# Patient Record
Sex: MALE | Race: BLACK OR AFRICAN AMERICAN | NOT HISPANIC OR LATINO | URBAN - METROPOLITAN AREA
[De-identification: names, ages, dates, MRNs, and addresses within clinical notes are randomized per-mention and may not be internally consistent; named-entity substitution may affect disease eponyms.]

---

## 2020-07-25 ENCOUNTER — TELEPHONE ENCOUNTER (OUTPATIENT)
Dept: URBAN - METROPOLITAN AREA CLINIC 13 | Facility: CLINIC | Age: 60
End: 2020-07-25

## 2020-07-25 RX ORDER — DICYCLOMINE HYDROCHLORIDE 10 MG/1
CAPSULE ORAL
Qty: 60 | Refills: 0 | OUTPATIENT
Start: 2019-09-26 | End: 2020-01-08

## 2020-07-25 RX ORDER — CYCLOBENZAPRINE HYDROCHLORIDE 5 MG/1
TABLET, FILM COATED ORAL
Qty: 30 | Refills: 0 | OUTPATIENT
Start: 2019-01-25 | End: 2020-01-08

## 2020-07-25 RX ORDER — KETOCONAZOLE 20 MG/G
APPLY A THIN LAYER TO AFFECTED AREA(S) TWICE DAILY CREAM TOPICAL
Refills: 0 | OUTPATIENT
Start: 2019-07-01 | End: 2020-01-08

## 2020-07-25 RX ORDER — ESOMEPRAZOLE MAGNESIUM 40 MG/1
TAKE 1 CAPSULE ONCE DAILY CAPSULE, DELAYED RELEASE PELLETS ORAL
Refills: 0 | OUTPATIENT
Start: 2019-01-10 | End: 2020-01-08

## 2020-07-25 RX ORDER — PREDNISONE 20 MG/1
TAKE 1 TABLET AS NEEDED TABLET ORAL
Refills: 0 | OUTPATIENT
Start: 2019-11-08 | End: 2020-01-08

## 2020-07-25 RX ORDER — SERTRALINE 50 MG/1
TAKE 1 TABLET DAILY TABLET, FILM COATED ORAL
Refills: 0 | OUTPATIENT
Start: 2019-08-16 | End: 2020-01-08

## 2020-07-25 RX ORDER — SODIUM SULFATE, POTASSIUM SULFATE, MAGNESIUM SULFATE 17.5; 3.13; 1.6 G/ML; G/ML; G/ML
SOLUTION, CONCENTRATE ORAL
Qty: 354 | Refills: 0 | OUTPATIENT
Start: 2019-08-16 | End: 2020-01-08

## 2020-07-25 RX ORDER — CLOTRIMAZOLE 10 MG/G
APPLY 2-3 TIMES DAILY TO AFFECTED AREA(S) CREAM TOPICAL
Refills: 0 | OUTPATIENT
Start: 2019-10-09 | End: 2020-01-08

## 2020-07-25 RX ORDER — DICLOFENAC SODIUM 75 MG/1
TABLET, DELAYED RELEASE ORAL
Qty: 60 | Refills: 0 | OUTPATIENT
Start: 2019-06-28 | End: 2020-01-08

## 2020-07-25 RX ORDER — METHYLPREDNISOLONE 4 MG/1
TABLET ORAL
Qty: 21 | Refills: 0 | OUTPATIENT
Start: 2019-02-27 | End: 2020-01-08

## 2020-07-25 RX ORDER — HYDROCORTISONE ACETATE 25 MG
INSERT 1 SUPPOSITORY RECTALLY AT BEDTIME AS NEEDED SUPPOSITORY, RECTAL RECTAL
Refills: 0 | OUTPATIENT
Start: 2019-10-30 | End: 2020-01-08

## 2020-07-25 RX ORDER — METHOCARBAMOL 500 MG/1
TABLET, FILM COATED ORAL
Qty: 40 | Refills: 0 | OUTPATIENT
Start: 2019-06-28 | End: 2020-01-08

## 2020-07-25 RX ORDER — ALPRAZOLAM 2 MG/1
TAKE 1 TABLET TWICE DAILY AS NEEDED TABLET ORAL
Refills: 0 | OUTPATIENT
Start: 2019-12-04 | End: 2020-01-08

## 2020-07-25 RX ORDER — CYCLOBENZAPRINE HYDROCHLORIDE 10 MG/1
TAKE 1 TABLET TWICE DAILY AS NEEDED TABLET, FILM COATED ORAL
Refills: 0 | OUTPATIENT
Start: 2019-12-28 | End: 2020-01-08

## 2020-07-25 RX ORDER — TERBINAFINE HYDROCHLORIDE 250 MG/1
TAKE 1 TABLET DAILY AS DIRECTED TABLET ORAL
Refills: 0 | OUTPATIENT
Start: 2019-10-08 | End: 2020-01-08

## 2020-07-26 ENCOUNTER — TELEPHONE ENCOUNTER (OUTPATIENT)
Dept: URBAN - METROPOLITAN AREA CLINIC 13 | Facility: CLINIC | Age: 60
End: 2020-07-26

## 2020-07-26 RX ORDER — BIFIDOBACTERIUM LONGUM 10MM CELL
TAKE 1 CAPSULE DAILY CAPSULE ORAL
Refills: 0 | Status: ACTIVE | COMMUNITY

## 2020-07-26 RX ORDER — DICYCLOMINE HYDROCHLORIDE 20 MG/1
TAKE 2 TABLET 4 TIMES DAILY PRN TABLET ORAL
Qty: 240 | Refills: 2 | Status: ACTIVE | COMMUNITY
Start: 2019-11-13

## 2020-07-26 RX ORDER — TRAMADOL HYDROCHLORIDE AND ACETAMINOPHEN 37.5; 325 MG/1; MG/1
TAKE 2 TABLETS EVERY 4-6 HOURS AS NEEDED FOR PAIN RELIEF TABLET, FILM COATED ORAL
Refills: 0 | Status: ACTIVE | COMMUNITY
Start: 2019-01-24

## 2020-07-26 RX ORDER — LIDOCAINE HYDROCHLORIDE 30 MG/G
USE TOPICALLY AS DIRECTED CREAM TOPICAL
Refills: 0 | Status: ACTIVE | COMMUNITY

## 2020-07-26 RX ORDER — RAMIPRIL 10 MG/1
TAKE 1 CAPSULE ONCE DAILY CAPSULE ORAL
Refills: 0 | Status: ACTIVE | COMMUNITY
Start: 2019-02-13

## 2020-07-26 RX ORDER — SODIUM SULFATE, POTASSIUM SULFATE, MAGNESIUM SULFATE 17.5; 3.13; 1.6 G/ML; G/ML; G/ML
5PM THE DAY BEFORE PROCEDURE, DRINK 1/2 OF PREP, THEN 6HOURS BEFORE PROCEDURE DRINK REMAINDER OF PREP SOLUTION, CONCENTRATE ORAL
Qty: 1 | Refills: 0 | Status: ACTIVE | COMMUNITY
Start: 2020-07-29

## 2020-07-26 RX ORDER — GABAPENTIN 300 MG/1
TAKE 1 CAPSULE CAPSULE ORAL
Refills: 0 | Status: ACTIVE | COMMUNITY
Start: 2020-01-02

## 2020-07-26 RX ORDER — HYDROCHLOROTHIAZIDE 12.5 MG/1
TAKE 1 CAPSULE ONCE DAILY CAPSULE ORAL
Refills: 0 | Status: ACTIVE | COMMUNITY
Start: 2019-02-22

## 2020-07-26 RX ORDER — FLUTICASONE PROPIONATE 50 UG/1
USE 1 SPRAY IN EACH NOSTRIL TWICE DAILY SPRAY, METERED NASAL
Refills: 0 | Status: ACTIVE | COMMUNITY
Start: 2019-06-26

## 2020-07-26 RX ORDER — TIZANIDINE 4 MG/1
TAKE 1 OR 2 TABLETS EVERY 8 HOURS AS NEEDED TABLET ORAL
Refills: 0 | Status: ACTIVE | COMMUNITY
Start: 2020-01-02

## 2020-07-26 RX ORDER — OMEPRAZOLE 40 MG/1
TAKE 1 CAPSULE DAILY CAPSULE, DELAYED RELEASE ORAL
Refills: 0 | Status: ACTIVE | COMMUNITY
Start: 2019-12-03

## 2020-07-26 RX ORDER — FAMOTIDINE 20 MG/1
TAKE 1 TABLET DAILY TABLET ORAL
Refills: 0 | Status: ACTIVE | COMMUNITY
Start: 2019-11-07

## 2020-07-26 RX ORDER — MELOXICAM 15 MG/1
TAKE 1 TABLET DAILY AS NEEDED TABLET ORAL
Refills: 0 | Status: ACTIVE | COMMUNITY
Start: 2019-12-16

## 2020-08-26 ENCOUNTER — TELEPHONE ENCOUNTER (OUTPATIENT)
Dept: URBAN - METROPOLITAN AREA CLINIC 113 | Facility: CLINIC | Age: 60
End: 2020-08-26

## 2020-08-28 ENCOUNTER — OFFICE VISIT (OUTPATIENT)
Dept: URBAN - METROPOLITAN AREA SURGERY CENTER 25 | Facility: SURGERY CENTER | Age: 60
End: 2020-08-28
Payer: MEDICARE

## 2020-08-28 ENCOUNTER — CLAIMS CREATED FROM THE CLAIM WINDOW (OUTPATIENT)
Dept: URBAN - METROPOLITAN AREA CLINIC 4 | Facility: CLINIC | Age: 60
End: 2020-08-28
Payer: MEDICARE

## 2020-08-28 DIAGNOSIS — K63.89 BACTERIAL OVERGROWTH SYNDROME: ICD-10-CM

## 2020-08-28 DIAGNOSIS — K63.89 OTHER SPECIFIED DISEASES OF INTESTINE: ICD-10-CM

## 2020-08-28 DIAGNOSIS — Z85.038 PERSONAL HISTORY OF COLON CANCER: ICD-10-CM

## 2020-08-28 PROCEDURE — 45380 COLONOSCOPY AND BIOPSY: CPT | Performed by: INTERNAL MEDICINE

## 2020-08-28 PROCEDURE — G9936 PMH PLYP/NEO CO/RECT/JUN/ANS: HCPCS | Performed by: INTERNAL MEDICINE

## 2020-08-28 PROCEDURE — 88305 TISSUE EXAM BY PATHOLOGIST: CPT | Performed by: PATHOLOGY

## 2020-08-28 RX ORDER — TRAMADOL HYDROCHLORIDE AND ACETAMINOPHEN 37.5; 325 MG/1; MG/1
TAKE 2 TABLETS EVERY 4-6 HOURS AS NEEDED FOR PAIN RELIEF TABLET, FILM COATED ORAL
Refills: 0 | Status: ACTIVE | COMMUNITY
Start: 2019-01-24

## 2020-08-28 RX ORDER — RAMIPRIL 10 MG/1
TAKE 1 CAPSULE ONCE DAILY CAPSULE ORAL
Refills: 0 | Status: ACTIVE | COMMUNITY
Start: 2019-02-13

## 2020-08-28 RX ORDER — OMEPRAZOLE 40 MG/1
TAKE 1 CAPSULE DAILY CAPSULE, DELAYED RELEASE ORAL
Refills: 0 | Status: ACTIVE | COMMUNITY
Start: 2019-12-03

## 2020-08-28 RX ORDER — SODIUM SULFATE, POTASSIUM SULFATE, MAGNESIUM SULFATE 17.5; 3.13; 1.6 G/ML; G/ML; G/ML
5PM THE DAY BEFORE PROCEDURE, DRINK 1/2 OF PREP, THEN 6HOURS BEFORE PROCEDURE DRINK REMAINDER OF PREP SOLUTION, CONCENTRATE ORAL
Qty: 1 | Refills: 0 | Status: ACTIVE | COMMUNITY
Start: 2020-07-29

## 2020-08-28 RX ORDER — DICYCLOMINE HYDROCHLORIDE 20 MG/1
TAKE 2 TABLET 4 TIMES DAILY PRN TABLET ORAL
Qty: 240 | Refills: 2 | Status: ACTIVE | COMMUNITY
Start: 2019-11-13

## 2020-08-28 RX ORDER — BIFIDOBACTERIUM LONGUM 10MM CELL
TAKE 1 CAPSULE DAILY CAPSULE ORAL
Refills: 0 | Status: ACTIVE | COMMUNITY

## 2020-08-28 RX ORDER — LIDOCAINE HYDROCHLORIDE 30 MG/G
USE TOPICALLY AS DIRECTED CREAM TOPICAL
Refills: 0 | Status: ACTIVE | COMMUNITY

## 2020-08-28 RX ORDER — MELOXICAM 15 MG/1
TAKE 1 TABLET DAILY AS NEEDED TABLET ORAL
Refills: 0 | Status: ACTIVE | COMMUNITY
Start: 2019-12-16

## 2020-08-28 RX ORDER — TIZANIDINE 4 MG/1
TAKE 1 OR 2 TABLETS EVERY 8 HOURS AS NEEDED TABLET ORAL
Refills: 0 | Status: ACTIVE | COMMUNITY
Start: 2020-01-02

## 2020-08-28 RX ORDER — GABAPENTIN 300 MG/1
TAKE 1 CAPSULE CAPSULE ORAL
Refills: 0 | Status: ACTIVE | COMMUNITY
Start: 2020-01-02

## 2020-08-28 RX ORDER — FLUTICASONE PROPIONATE 50 UG/1
USE 1 SPRAY IN EACH NOSTRIL TWICE DAILY SPRAY, METERED NASAL
Refills: 0 | Status: ACTIVE | COMMUNITY
Start: 2019-06-26

## 2020-08-28 RX ORDER — HYDROCHLOROTHIAZIDE 12.5 MG/1
TAKE 1 CAPSULE ONCE DAILY CAPSULE ORAL
Refills: 0 | Status: ACTIVE | COMMUNITY
Start: 2019-02-22

## 2020-08-28 RX ORDER — FAMOTIDINE 20 MG/1
TAKE 1 TABLET DAILY TABLET ORAL
Refills: 0 | Status: ACTIVE | COMMUNITY
Start: 2019-11-07

## 2020-09-04 ENCOUNTER — OFFICE VISIT (OUTPATIENT)
Dept: URBAN - METROPOLITAN AREA CLINIC 113 | Facility: CLINIC | Age: 60
End: 2020-09-04
Payer: MEDICARE

## 2020-09-04 VITALS
BODY MASS INDEX: 31.01 KG/M2 | WEIGHT: 234 LBS | TEMPERATURE: 97.8 F | DIASTOLIC BLOOD PRESSURE: 82 MMHG | HEART RATE: 72 BPM | HEIGHT: 73 IN | RESPIRATION RATE: 20 BRPM | SYSTOLIC BLOOD PRESSURE: 138 MMHG

## 2020-09-04 DIAGNOSIS — K59.01 CONSTIPATION: ICD-10-CM

## 2020-09-04 DIAGNOSIS — C18.9 COLON CANCER: ICD-10-CM

## 2020-09-04 PROCEDURE — 99213 OFFICE O/P EST LOW 20 MIN: CPT | Performed by: INTERNAL MEDICINE

## 2020-09-04 PROCEDURE — G8427 DOCREV CUR MEDS BY ELIG CLIN: HCPCS | Performed by: INTERNAL MEDICINE

## 2020-09-04 PROCEDURE — 1036F TOBACCO NON-USER: CPT | Performed by: INTERNAL MEDICINE

## 2020-09-04 PROCEDURE — 3017F COLORECTAL CA SCREEN DOC REV: CPT | Performed by: INTERNAL MEDICINE

## 2020-09-04 PROCEDURE — G8419 CALC BMI OUT NRM PARAM NOF/U: HCPCS | Performed by: INTERNAL MEDICINE

## 2020-09-04 RX ORDER — SODIUM SULFATE, POTASSIUM SULFATE, MAGNESIUM SULFATE 17.5; 3.13; 1.6 G/ML; G/ML; G/ML
5PM THE DAY BEFORE PROCEDURE, DRINK 1/2 OF PREP, THEN 6HOURS BEFORE PROCEDURE DRINK REMAINDER OF PREP SOLUTION, CONCENTRATE ORAL
Qty: 1 | Refills: 0 | Status: DISCONTINUED | COMMUNITY
Start: 2020-07-29

## 2020-09-04 RX ORDER — OMEPRAZOLE 40 MG/1
TAKE 1 CAPSULE DAILY CAPSULE, DELAYED RELEASE ORAL
Refills: 0 | Status: ACTIVE | COMMUNITY
Start: 2019-12-03

## 2020-09-04 RX ORDER — ASPIRIN 325 MG
1 CAPSULE TABLET ORAL ONCE A DAY
Status: ACTIVE | COMMUNITY

## 2020-09-04 RX ORDER — DICYCLOMINE HYDROCHLORIDE 20 MG/1
TAKE 2 TABLET 4 TIMES DAILY PRN TABLET ORAL
Qty: 240 | Refills: 2 | Status: ACTIVE | COMMUNITY
Start: 2019-11-13

## 2020-09-04 RX ORDER — RAMIPRIL 10 MG/1
TAKE 1 CAPSULE ONCE DAILY CAPSULE ORAL
Refills: 0 | Status: ACTIVE | COMMUNITY
Start: 2019-02-13

## 2020-09-04 RX ORDER — BIFIDOBACTERIUM LONGUM 10MM CELL
TAKE 1 CAPSULE DAILY CAPSULE ORAL
Refills: 0 | Status: DISCONTINUED | COMMUNITY

## 2020-09-04 RX ORDER — PSYLLIUM SEED
AS DIRECTED PACKET (EA) ORAL
Status: ACTIVE | COMMUNITY

## 2020-09-04 RX ORDER — LIDOCAINE HYDROCHLORIDE 30 MG/G
USE TOPICALLY AS DIRECTED CREAM TOPICAL
Refills: 0 | Status: ACTIVE | COMMUNITY

## 2020-09-04 RX ORDER — GABAPENTIN 300 MG/1
TAKE 1 CAPSULE CAPSULE ORAL
Refills: 0 | Status: ACTIVE | COMMUNITY
Start: 2020-01-02

## 2020-09-04 RX ORDER — TIZANIDINE 4 MG/1
TAKE 1 OR 2 TABLETS EVERY 8 HOURS AS NEEDED TABLET ORAL
Refills: 0 | Status: DISCONTINUED | COMMUNITY
Start: 2020-01-02

## 2020-09-04 RX ORDER — TRAMADOL HYDROCHLORIDE AND ACETAMINOPHEN 37.5; 325 MG/1; MG/1
TAKE 2 TABLETS EVERY 4-6 HOURS AS NEEDED FOR PAIN RELIEF TABLET, FILM COATED ORAL
Refills: 0 | Status: ACTIVE | COMMUNITY
Start: 2019-01-24

## 2020-09-04 RX ORDER — BIFIDOBACTERIUM LONGUM 10MM CELL
AS DIRECTED CAPSULE ORAL
Status: ACTIVE | COMMUNITY

## 2020-09-04 RX ORDER — HYDROCHLOROTHIAZIDE 12.5 MG/1
TAKE 1 CAPSULE ONCE DAILY CAPSULE ORAL
Refills: 0 | Status: ACTIVE | COMMUNITY
Start: 2019-02-22

## 2020-09-04 RX ORDER — SALICYLIC ACID 3 G/100G
1 TABLET LOTION TOPICAL ONCE A DAY
Status: ACTIVE | COMMUNITY

## 2020-09-04 RX ORDER — FLUTICASONE PROPIONATE 50 UG/1
USE 1 SPRAY IN EACH NOSTRIL TWICE DAILY SPRAY, METERED NASAL
Refills: 0 | Status: DISCONTINUED | COMMUNITY
Start: 2019-06-26

## 2020-09-04 RX ORDER — IBUPROFEN 200 MG/1
1 TABLET WITH FOOD OR MILK AS NEEDED TABLET, FILM COATED ORAL THREE TIMES A DAY
Status: ACTIVE | COMMUNITY

## 2020-09-04 RX ORDER — FAMOTIDINE 20 MG/1
TAKE 1 TABLET DAILY TABLET ORAL
Refills: 0 | Status: ACTIVE | COMMUNITY
Start: 2019-11-07

## 2020-09-04 RX ORDER — MELOXICAM 15 MG/1
TAKE 1 TABLET DAILY AS NEEDED TABLET ORAL
Refills: 0 | Status: ACTIVE | COMMUNITY
Start: 2019-12-16

## 2020-09-04 NOTE — HPI-TODAY'S VISIT:
59 y/o male presenting for f/u. He does have a hx of colon cancer in 2014. He also had a tubular adenoma removed in 2019. He was taken for a colonoscopy recently (8/2020). It was normal. He is here today for f/u.   He does intermittently have constipation vs diarrhea. He cannot find any specific foods that cause diarrhea. He is taking Linzess; however he has stomach pain with it. He takes it PRN if he feels constipated. He has tried to take Metamucil every day. He sometimes doesn't like to take it since it causes feelings of incomplete evacuation.   He was taking Align in the past but could not tell any difference in taking it.  [place] [facility] [place], [place]osis.  [facility] [place]--sigmoid ta, hyperplastic polyp.
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
## 2020-11-09 VITALS
SYSTOLIC BLOOD PRESSURE: 178 MMHG | TEMPERATURE: 98 F | WEIGHT: 228.5 LBS | BODY MASS INDEX: 30.28 KG/M2 | HEART RATE: 74 BPM | HEIGHT: 73 IN | OXYGEN SATURATION: 98 % | DIASTOLIC BLOOD PRESSURE: 80 MMHG

## 2020-11-09 PROBLEM — E11.9 TYPE 2 DIABETES MELLITUS (HCC): Status: ACTIVE | Noted: 2020-11-09

## 2020-11-09 PROBLEM — K21.9 GERD (GASTROESOPHAGEAL REFLUX DISEASE): Status: ACTIVE | Noted: 2020-11-09

## 2020-11-09 PROBLEM — K58.9 IBS (IRRITABLE BOWEL SYNDROME): Status: ACTIVE | Noted: 2020-11-09

## 2020-11-09 PROBLEM — E66.9 OBESITY: Status: ACTIVE | Noted: 2020-11-09

## 2020-11-09 PROBLEM — M54.50 CHRONIC LOW BACK PAIN: Status: ACTIVE | Noted: 2020-11-09

## 2020-11-09 PROBLEM — G89.29 CHRONIC LOW BACK PAIN: Status: ACTIVE | Noted: 2020-11-09

## 2020-11-09 PROBLEM — E55.9 VITAMIN D DEFICIENCY: Status: ACTIVE | Noted: 2020-11-09

## 2020-11-09 PROBLEM — Z85.038 HISTORY OF MALIGNANT NEOPLASM OF COLON: Status: ACTIVE | Noted: 2020-11-09

## 2020-11-09 RX ORDER — OMEPRAZOLE 40 MG/1
40 CAPSULE, DELAYED RELEASE ORAL DAILY
COMMUNITY

## 2020-11-09 RX ORDER — ALPRAZOLAM 2 MG/1
TABLET ORAL
COMMUNITY

## 2020-11-09 RX ORDER — DICYCLOMINE HYDROCHLORIDE 10 MG/1
10 CAPSULE ORAL
COMMUNITY

## 2020-11-09 RX ORDER — TIZANIDINE 4 MG/1
4 TABLET ORAL
COMMUNITY

## 2020-11-09 RX ORDER — CHLORPHENIRAMINE MALEATE 4 MG
TABLET ORAL 2 TIMES DAILY
COMMUNITY

## 2020-11-09 RX ORDER — RAMIPRIL 10 MG/1
10 CAPSULE ORAL DAILY
COMMUNITY

## 2020-11-09 RX ORDER — FLUTICASONE PROPIONATE 50 MCG
2 SPRAY, SUSPENSION (ML) NASAL DAILY
COMMUNITY

## 2020-11-09 RX ORDER — SERTRALINE HYDROCHLORIDE 50 MG/1
TABLET, FILM COATED ORAL DAILY
COMMUNITY

## 2020-11-09 RX ORDER — HYDROCORTISONE 25 MG/G
CREAM TOPICAL 2 TIMES DAILY
COMMUNITY

## 2020-11-09 RX ORDER — DICLOFENAC SODIUM 10 MG/G
GEL TOPICAL 4 TIMES DAILY
COMMUNITY

## 2020-11-09 RX ORDER — MELOXICAM 15 MG/1
15 TABLET ORAL DAILY
COMMUNITY

## 2020-11-09 RX ORDER — FAMOTIDINE 20 MG/1
20 TABLET, FILM COATED ORAL 2 TIMES DAILY
COMMUNITY

## 2020-11-09 RX ORDER — PREDNISONE 20 MG/1
TABLET ORAL
COMMUNITY

## 2020-11-09 RX ORDER — TRAMADOL HYDROCHLORIDE 50 MG/1
50 TABLET ORAL
COMMUNITY

## 2020-11-09 RX ORDER — HYDROCORTISONE ACETATE 25 MG/1
25 SUPPOSITORY RECTAL EVERY 12 HOURS
COMMUNITY

## 2020-11-09 RX ORDER — LIDOCAINE 50 MG/G
PATCH TOPICAL EVERY 24 HOURS
COMMUNITY

## 2020-11-09 RX ORDER — DICYCLOMINE HYDROCHLORIDE 20 MG/1
20 TABLET ORAL EVERY 6 HOURS
COMMUNITY

## 2020-11-09 RX ORDER — HYOSCYAMINE SULFATE 0.12 MG/1
0.12 TABLET, ORALLY DISINTEGRATING ORAL
COMMUNITY

## 2020-11-09 RX ORDER — HYDROCHLOROTHIAZIDE 12.5 MG/1
12.5 CAPSULE ORAL DAILY
COMMUNITY

## 2020-11-09 RX ORDER — GABAPENTIN 300 MG/1
300 CAPSULE ORAL 3 TIMES DAILY
COMMUNITY

## 2020-11-09 RX ORDER — ESOMEPRAZOLE MAGNESIUM 40 MG/1
CAPSULE, DELAYED RELEASE ORAL DAILY
COMMUNITY

## 2020-11-09 RX ORDER — CYCLOBENZAPRINE HCL 10 MG
TABLET ORAL
COMMUNITY

## 2020-12-15 ENCOUNTER — OFFICE VISIT (OUTPATIENT)
Dept: URBAN - METROPOLITAN AREA CLINIC 113 | Facility: CLINIC | Age: 60
End: 2020-12-15
Payer: MEDICARE

## 2020-12-15 VITALS
SYSTOLIC BLOOD PRESSURE: 122 MMHG | RESPIRATION RATE: 18 BRPM | HEIGHT: 73 IN | TEMPERATURE: 98 F | HEART RATE: 71 BPM | DIASTOLIC BLOOD PRESSURE: 77 MMHG | BODY MASS INDEX: 31.81 KG/M2 | WEIGHT: 240 LBS

## 2020-12-15 DIAGNOSIS — R14.0 BLOATING: ICD-10-CM

## 2020-12-15 DIAGNOSIS — K63.89 OTHER SPECIFIED DISEASES OF INTESTINE: ICD-10-CM

## 2020-12-15 DIAGNOSIS — K59.01 CONSTIPATION: ICD-10-CM

## 2020-12-15 DIAGNOSIS — C18.9 COLON CANCER: ICD-10-CM

## 2020-12-15 PROCEDURE — G9901 SNP/LG TRM CRE PT W/POS CDE: HCPCS | Performed by: INTERNAL MEDICINE

## 2020-12-15 PROCEDURE — G8430 EC AT DOC MEDREC PT NOT ELIG: HCPCS | Performed by: INTERNAL MEDICINE

## 2020-12-15 PROCEDURE — G8422 PT INELIG BMI CALCULATION: HCPCS | Performed by: INTERNAL MEDICINE

## 2020-12-15 PROCEDURE — G8482 FLU IMMUNIZE ORDER/ADMIN: HCPCS | Performed by: INTERNAL MEDICINE

## 2020-12-15 PROCEDURE — 1036F TOBACCO NON-USER: CPT | Performed by: INTERNAL MEDICINE

## 2020-12-15 PROCEDURE — 99213 OFFICE O/P EST LOW 20 MIN: CPT | Performed by: INTERNAL MEDICINE

## 2020-12-15 RX ORDER — MELOXICAM 15 MG/1
TAKE 1 TABLET DAILY AS NEEDED TABLET ORAL
Refills: 0 | Status: DISCONTINUED | COMMUNITY
Start: 2019-12-16

## 2020-12-15 RX ORDER — HYDROCHLOROTHIAZIDE 12.5 MG/1
TAKE 1 CAPSULE ONCE DAILY CAPSULE ORAL
Refills: 0 | Status: ACTIVE | COMMUNITY
Start: 2019-02-22

## 2020-12-15 RX ORDER — FAMOTIDINE 20 MG/1
TAKE 1 TABLET DAILY TABLET ORAL
Refills: 0 | Status: ACTIVE | COMMUNITY
Start: 2019-11-07

## 2020-12-15 RX ORDER — TRAMADOL HYDROCHLORIDE AND ACETAMINOPHEN 37.5; 325 MG/1; MG/1
TAKE 2 TABLETS EVERY 4-6 HOURS AS NEEDED FOR PAIN RELIEF TABLET, FILM COATED ORAL
Refills: 0 | Status: ACTIVE | COMMUNITY
Start: 2019-01-24

## 2020-12-15 RX ORDER — SALICYLIC ACID 3 G/100G
1 TABLET LOTION TOPICAL ONCE A DAY
Status: ACTIVE | COMMUNITY

## 2020-12-15 RX ORDER — ASPIRIN 325 MG
1 CAPSULE TABLET ORAL ONCE A DAY
Status: ACTIVE | COMMUNITY

## 2020-12-15 RX ORDER — GABAPENTIN 300 MG/1
TAKE 1 CAPSULE CAPSULE ORAL
Refills: 0 | Status: ACTIVE | COMMUNITY
Start: 2020-01-02

## 2020-12-15 RX ORDER — OMEPRAZOLE 40 MG/1
TAKE 1 CAPSULE DAILY CAPSULE, DELAYED RELEASE ORAL
Refills: 0 | Status: ACTIVE | COMMUNITY
Start: 2019-12-03

## 2020-12-15 RX ORDER — PSYLLIUM SEED
AS DIRECTED PACKET (EA) ORAL
Status: ACTIVE | COMMUNITY

## 2020-12-15 RX ORDER — DICYCLOMINE HYDROCHLORIDE 20 MG/1
TAKE 2 TABLET 4 TIMES DAILY PRN TABLET ORAL
Qty: 240 | Refills: 2 | Status: ACTIVE | COMMUNITY
Start: 2019-11-13

## 2020-12-15 RX ORDER — LIDOCAINE HYDROCHLORIDE 30 MG/G
USE TOPICALLY AS DIRECTED CREAM TOPICAL
Refills: 0 | Status: ACTIVE | COMMUNITY

## 2020-12-15 RX ORDER — BIFIDOBACTERIUM LONGUM 10MM CELL
AS DIRECTED CAPSULE ORAL
Status: ACTIVE | COMMUNITY

## 2020-12-15 RX ORDER — IBUPROFEN 200 MG/1
1 TABLET WITH FOOD OR MILK AS NEEDED TABLET, FILM COATED ORAL THREE TIMES A DAY
Status: DISCONTINUED | COMMUNITY

## 2020-12-15 RX ORDER — DICYCLOMINE HYDROCHLORIDE 20 MG/1
TAKE 2 TABLET 4 TIMES DAILY PRN TABLET ORAL
Qty: 240
Start: 2019-11-13

## 2020-12-15 RX ORDER — RAMIPRIL 10 MG/1
TAKE 1 CAPSULE ONCE DAILY CAPSULE ORAL
Refills: 0 | Status: ACTIVE | COMMUNITY
Start: 2019-02-13
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
## 2020-12-15 NOTE — HPI-TODAY'S VISIT:
59 y/o male presenting for f/u. He has a prior hx of colon cancer and constipation. He has a bowel movement about every 2 days and does not take any medications currently. He was on benefiber and Miralax and stopped taking those. He does not have any rectal bleeding. He does have intermittent abdominal pain. He does take dicyclomine once daily; he is unsure if it helps his pain.   He has taken Align in the past and will go back on it. He was taking it for gas/bloating and it worked well.   9/4/2020 59 y/o male presenting for f/u. He does have a hx of colon cancer in 2014. He also had a tubular adenoma removed in 2019. He was taken for a colonoscopy recently (8/2020). It was normal. He is here today for f/u.   He does intermittently have constipation vs diarrhea. He cannot find any specific foods that cause diarrhea. He is taking Linzess; however he has stomach pain with it. He takes it PRN if he feels constipated. He has tried to take Metamucil every day. He sometimes doesn't like to take it since it causes feelings of incomplete evacuation.   He was taking Align in the past but could not tell any difference in taking it.  [place] [facility] [place], [place].  [facility] [place]sigmoid ta, hyperplastic polyp.

## 2021-02-16 ENCOUNTER — OFFICE VISIT (OUTPATIENT)
Dept: URBAN - METROPOLITAN AREA CLINIC 113 | Facility: CLINIC | Age: 61
End: 2021-02-16

## 2021-02-17 ENCOUNTER — TELEPHONE ENCOUNTER (OUTPATIENT)
Dept: URBAN - METROPOLITAN AREA CLINIC 113 | Facility: CLINIC | Age: 61
End: 2021-02-17

## 2021-02-19 ENCOUNTER — OFFICE VISIT (OUTPATIENT)
Dept: URBAN - METROPOLITAN AREA CLINIC 113 | Facility: CLINIC | Age: 61
End: 2021-02-19
Payer: MEDICARE

## 2021-02-19 VITALS
DIASTOLIC BLOOD PRESSURE: 75 MMHG | SYSTOLIC BLOOD PRESSURE: 120 MMHG | TEMPERATURE: 97.3 F | HEART RATE: 85 BPM | RESPIRATION RATE: 20 BRPM | WEIGHT: 222 LBS | HEIGHT: 73 IN | BODY MASS INDEX: 29.42 KG/M2

## 2021-02-19 DIAGNOSIS — K21.9 GASTROESOPHAGEAL REFLUX DISEASE, UNSPECIFIED WHETHER ESOPHAGITIS PRESENT: ICD-10-CM

## 2021-02-19 DIAGNOSIS — K63.89 OTHER SPECIFIED DISEASES OF INTESTINE: ICD-10-CM

## 2021-02-19 DIAGNOSIS — C18.9 COLON CANCER: ICD-10-CM

## 2021-02-19 DIAGNOSIS — R14.0 BLOATING: ICD-10-CM

## 2021-02-19 DIAGNOSIS — K59.01 CONSTIPATION: ICD-10-CM

## 2021-02-19 DIAGNOSIS — R11.2 INTRACTABLE VOMITING WITH NAUSEA, UNSPECIFIED VOMITING TYPE: ICD-10-CM

## 2021-02-19 PROCEDURE — 99213 OFFICE O/P EST LOW 20 MIN: CPT | Performed by: INTERNAL MEDICINE

## 2021-02-19 RX ORDER — TRAMADOL HYDROCHLORIDE AND ACETAMINOPHEN 37.5; 325 MG/1; MG/1
TAKE 2 TABLETS EVERY 4-6 HOURS AS NEEDED FOR PAIN RELIEF TABLET, FILM COATED ORAL
Refills: 0 | Status: ACTIVE | COMMUNITY
Start: 2019-01-24

## 2021-02-19 RX ORDER — FAMOTIDINE 20 MG/1
TAKE 1 TABLET DAILY TABLET ORAL
Refills: 0 | Status: ACTIVE | COMMUNITY
Start: 2019-11-07

## 2021-02-19 RX ORDER — ASPIRIN 325 MG
1 CAPSULE TABLET ORAL ONCE A DAY
Status: ACTIVE | COMMUNITY

## 2021-02-19 RX ORDER — BIFIDOBACTERIUM LONGUM 10MM CELL
AS DIRECTED CAPSULE ORAL
Status: ACTIVE | COMMUNITY

## 2021-02-19 RX ORDER — PSYLLIUM SEED
AS DIRECTED PACKET (EA) ORAL
Status: ACTIVE | COMMUNITY

## 2021-02-19 RX ORDER — RAMIPRIL 10 MG/1
TAKE 1 CAPSULE ONCE DAILY CAPSULE ORAL
Refills: 0 | Status: ACTIVE | COMMUNITY
Start: 2019-02-13

## 2021-02-19 RX ORDER — HYDROCHLOROTHIAZIDE 12.5 MG/1
TAKE 1 CAPSULE ONCE DAILY CAPSULE ORAL
Refills: 0 | Status: ACTIVE | COMMUNITY
Start: 2019-02-22

## 2021-02-19 RX ORDER — SALICYLIC ACID 3 G/100G
1 TABLET LOTION TOPICAL ONCE A DAY
Status: ACTIVE | COMMUNITY

## 2021-02-19 RX ORDER — LIDOCAINE HYDROCHLORIDE 30 MG/G
USE TOPICALLY AS DIRECTED CREAM TOPICAL
Refills: 0 | Status: ACTIVE | COMMUNITY

## 2021-02-19 RX ORDER — METFORMIN HYDROCHLORIDE 500 MG/1
1 TABLET WITH A MEAL TABLET, FILM COATED ORAL ONCE A DAY
Status: ACTIVE | COMMUNITY

## 2021-02-19 RX ORDER — OMEPRAZOLE 40 MG/1
TAKE 1 CAPSULE DAILY CAPSULE, DELAYED RELEASE ORAL
Refills: 0 | Status: ACTIVE | COMMUNITY
Start: 2019-12-03

## 2021-02-19 RX ORDER — DICYCLOMINE HYDROCHLORIDE 20 MG/1
TAKE 2 TABLET 4 TIMES DAILY PRN TABLET ORAL
Qty: 240 | Status: ACTIVE | COMMUNITY
Start: 2019-11-13

## 2021-02-19 RX ORDER — GABAPENTIN 300 MG/1
TAKE 1 CAPSULE CAPSULE ORAL
Refills: 0 | Status: ACTIVE | COMMUNITY
Start: 2020-01-02

## 2021-02-19 NOTE — HPI-TODAY'S VISIT:
62 y/o male presenting for f/u. He was last seen in the clinic in Dec 2020. Since last saturday he has been having abdominal pain/spasm/bloating. He has not had any nausea/emesis. He had also lost about 11 lbs.  I placed him on dicyclomine PRN and he has been taking it.   12/15/2020 61 y/o male presenting for f/u. He has a prior hx of colon cancer and constipation. He has a bowel movement about every 2 days and does not take any medications currently. He was on benefiber and Miralax and stopped taking those. He does not have any rectal bleeding. He does have intermittent abdominal pain. He does take dicyclomine once daily; he is unsure if it helps his pain.   He has taken Align in the past and will go back on it. He was taking it for gas/bloating and it worked well.   9/4/2020 61 y/o male presenting for f/u. He does have a hx of colon cancer in 2014. He also had a tubular adenoma removed in 2019. He was taken for a colonoscopy recently (8/2020). It was normal. He is here today for f/u.   He does intermittently have constipation vs diarrhea. He cannot find any specific foods that cause diarrhea. He is taking Linzess; however he has stomach pain with it. He takes it PRN if he feels constipated. He has tried to take Metamucil every day. He sometimes doesn't like to take it since it causes feelings of incomplete evacuation.   He was taking Align in the past but could not tell any difference in taking it.  [place] [facility] [place], [place]osis.  [facility] [place]  [place]--sigmoid ta, hyperplastic polyp.

## 2021-02-22 ENCOUNTER — OFFICE VISIT (OUTPATIENT)
Dept: URBAN - METROPOLITAN AREA SURGERY CENTER 25 | Facility: SURGERY CENTER | Age: 61
End: 2021-02-22
Payer: MEDICARE

## 2021-02-22 ENCOUNTER — CLAIMS CREATED FROM THE CLAIM WINDOW (OUTPATIENT)
Dept: URBAN - METROPOLITAN AREA CLINIC 4 | Facility: CLINIC | Age: 61
End: 2021-02-22
Payer: MEDICARE

## 2021-02-22 DIAGNOSIS — K31.89 DILATION OF STOMACH: ICD-10-CM

## 2021-02-22 DIAGNOSIS — R10.13 ABDOMINAL DISCOMFORT, EPIGASTRIC: ICD-10-CM

## 2021-02-22 PROCEDURE — 88305 TISSUE EXAM BY PATHOLOGIST: CPT | Performed by: PATHOLOGY

## 2021-02-22 PROCEDURE — 43239 EGD BIOPSY SINGLE/MULTIPLE: CPT | Performed by: INTERNAL MEDICINE

## 2021-02-22 PROCEDURE — G8907 PT DOC NO EVENTS ON DISCHARG: HCPCS | Performed by: INTERNAL MEDICINE

## 2021-02-22 PROCEDURE — 88312 SPECIAL STAINS GROUP 1: CPT | Performed by: PATHOLOGY

## 2021-02-22 RX ORDER — PSYLLIUM SEED
AS DIRECTED PACKET (EA) ORAL
Status: ACTIVE | COMMUNITY

## 2021-02-22 RX ORDER — BIFIDOBACTERIUM LONGUM 10MM CELL
AS DIRECTED CAPSULE ORAL
Status: ACTIVE | COMMUNITY

## 2021-02-22 RX ORDER — HYDROCHLOROTHIAZIDE 12.5 MG/1
TAKE 1 CAPSULE ONCE DAILY CAPSULE ORAL
Refills: 0 | Status: ACTIVE | COMMUNITY
Start: 2019-02-22

## 2021-02-22 RX ORDER — TRAMADOL HYDROCHLORIDE AND ACETAMINOPHEN 37.5; 325 MG/1; MG/1
TAKE 2 TABLETS EVERY 4-6 HOURS AS NEEDED FOR PAIN RELIEF TABLET, FILM COATED ORAL
Refills: 0 | Status: ACTIVE | COMMUNITY
Start: 2019-01-24

## 2021-02-22 RX ORDER — RAMIPRIL 10 MG/1
TAKE 1 CAPSULE ONCE DAILY CAPSULE ORAL
Refills: 0 | Status: ACTIVE | COMMUNITY
Start: 2019-02-13

## 2021-02-22 RX ORDER — FAMOTIDINE 20 MG/1
TAKE 1 TABLET DAILY TABLET ORAL
Refills: 0 | Status: ACTIVE | COMMUNITY
Start: 2019-11-07

## 2021-02-22 RX ORDER — SALICYLIC ACID 3 G/100G
1 TABLET LOTION TOPICAL ONCE A DAY
Status: ACTIVE | COMMUNITY

## 2021-02-22 RX ORDER — METFORMIN HYDROCHLORIDE 500 MG/1
1 TABLET WITH A MEAL TABLET, FILM COATED ORAL ONCE A DAY
Status: ACTIVE | COMMUNITY

## 2021-02-22 RX ORDER — GABAPENTIN 300 MG/1
TAKE 1 CAPSULE CAPSULE ORAL
Refills: 0 | Status: ACTIVE | COMMUNITY
Start: 2020-01-02

## 2021-02-22 RX ORDER — OMEPRAZOLE 40 MG/1
TAKE 1 CAPSULE DAILY CAPSULE, DELAYED RELEASE ORAL
Refills: 0 | Status: ACTIVE | COMMUNITY
Start: 2019-12-03

## 2021-02-22 RX ORDER — DICYCLOMINE HYDROCHLORIDE 20 MG/1
TAKE 2 TABLET 4 TIMES DAILY PRN TABLET ORAL
Qty: 240 | Status: ACTIVE | COMMUNITY
Start: 2019-11-13

## 2021-02-22 RX ORDER — LIDOCAINE HYDROCHLORIDE 30 MG/G
USE TOPICALLY AS DIRECTED CREAM TOPICAL
Refills: 0 | Status: ACTIVE | COMMUNITY

## 2021-02-22 RX ORDER — ASPIRIN 325 MG
1 CAPSULE TABLET ORAL ONCE A DAY
Status: ACTIVE | COMMUNITY

## 2021-02-26 ENCOUNTER — OFFICE VISIT (OUTPATIENT)
Dept: URBAN - METROPOLITAN AREA CLINIC 113 | Facility: CLINIC | Age: 61
End: 2021-02-26

## 2021-03-09 ENCOUNTER — OFFICE VISIT (OUTPATIENT)
Dept: URBAN - METROPOLITAN AREA CLINIC 107 | Facility: CLINIC | Age: 61
End: 2021-03-09
Payer: MEDICARE

## 2021-03-09 VITALS
TEMPERATURE: 98.5 F | HEIGHT: 73 IN | SYSTOLIC BLOOD PRESSURE: 136 MMHG | DIASTOLIC BLOOD PRESSURE: 79 MMHG | WEIGHT: 221 LBS | HEART RATE: 75 BPM | BODY MASS INDEX: 29.29 KG/M2

## 2021-03-09 DIAGNOSIS — R11.2 INTRACTABLE VOMITING WITH NAUSEA, UNSPECIFIED VOMITING TYPE: ICD-10-CM

## 2021-03-09 DIAGNOSIS — R14.0 BLOATING: ICD-10-CM

## 2021-03-09 DIAGNOSIS — K59.01 CONSTIPATION: ICD-10-CM

## 2021-03-09 DIAGNOSIS — K21.9 GASTROESOPHAGEAL REFLUX DISEASE, UNSPECIFIED WHETHER ESOPHAGITIS PRESENT: ICD-10-CM

## 2021-03-09 DIAGNOSIS — K63.89 OTHER SPECIFIED DISEASES OF INTESTINE: ICD-10-CM

## 2021-03-09 DIAGNOSIS — C18.9 COLON CANCER: ICD-10-CM

## 2021-03-09 PROCEDURE — 99213 OFFICE O/P EST LOW 20 MIN: CPT | Performed by: INTERNAL MEDICINE

## 2021-03-09 RX ORDER — HYDROCHLOROTHIAZIDE 12.5 MG/1
TAKE 1 CAPSULE ONCE DAILY CAPSULE ORAL
Refills: 0 | Status: ACTIVE | COMMUNITY
Start: 2019-02-22

## 2021-03-09 RX ORDER — LIDOCAINE HYDROCHLORIDE 30 MG/G
USE TOPICALLY AS DIRECTED CREAM TOPICAL
Refills: 0 | Status: ACTIVE | COMMUNITY

## 2021-03-09 RX ORDER — RAMIPRIL 10 MG/1
TAKE 1 CAPSULE ONCE DAILY CAPSULE ORAL
Refills: 0 | Status: ACTIVE | COMMUNITY
Start: 2019-02-13

## 2021-03-09 RX ORDER — GABAPENTIN 300 MG/1
TAKE 1 CAPSULE CAPSULE ORAL
Refills: 0 | Status: ACTIVE | COMMUNITY
Start: 2020-01-02

## 2021-03-09 RX ORDER — METFORMIN HYDROCHLORIDE 500 MG/1
1 TABLET WITH A MEAL TABLET, FILM COATED ORAL ONCE A DAY
Status: ACTIVE | COMMUNITY

## 2021-03-09 RX ORDER — BIFIDOBACTERIUM LONGUM 10MM CELL
AS DIRECTED CAPSULE ORAL
Status: ACTIVE | COMMUNITY

## 2021-03-09 RX ORDER — PSYLLIUM SEED
AS DIRECTED PACKET (EA) ORAL
Status: ACTIVE | COMMUNITY

## 2021-03-09 RX ORDER — FAMOTIDINE 20 MG/1
TAKE 1 TABLET DAILY TABLET ORAL
Refills: 0 | Status: ACTIVE | COMMUNITY
Start: 2019-11-07

## 2021-03-09 RX ORDER — TRAMADOL HYDROCHLORIDE AND ACETAMINOPHEN 37.5; 325 MG/1; MG/1
TAKE 2 TABLETS EVERY 4-6 HOURS AS NEEDED FOR PAIN RELIEF TABLET, FILM COATED ORAL
Refills: 0 | Status: ACTIVE | COMMUNITY
Start: 2019-01-24

## 2021-03-09 RX ORDER — ASPIRIN 325 MG
1 CAPSULE TABLET ORAL ONCE A DAY
Status: ACTIVE | COMMUNITY

## 2021-03-09 RX ORDER — DICYCLOMINE HYDROCHLORIDE 20 MG/1
TAKE 2 TABLET 4 TIMES DAILY PRN TABLET ORAL
Qty: 240 | Status: ACTIVE | COMMUNITY
Start: 2019-11-13

## 2021-03-09 RX ORDER — OMEPRAZOLE 20 MG/1
TAKE 1 CAPSULE DAILY CAPSULE, DELAYED RELEASE ORAL ONCE A DAY
Refills: 0 | Status: ACTIVE | COMMUNITY
Start: 2019-12-03

## 2021-03-09 RX ORDER — SALICYLIC ACID 3 G/100G
1 TABLET LOTION TOPICAL ONCE A DAY
Status: ACTIVE | COMMUNITY

## 2021-03-09 NOTE — PHYSICAL EXAM CARDIOVASCULAR:
regular rate and rhythm , S1, S2 normal ,  , no murmurs, rubs, gallops , no edema , no edema, no murmurs, regular rate and rhythm

## 2021-03-09 NOTE — PHYSICAL EXAM EYES:
Conjuntivae and eyelids appear normal , Sclerae : White without injection, no icterus. , Conjuntivae and eyelids appear normal, Sclerae : White without injection

## 2021-03-09 NOTE — PHYSICAL EXAM HENT:
atraumatic , normocephalic , Head, normocephalic, atraumatic, Face, Face within normal limits, Ears, External ears within normal limits, Nose/Nasopharynx, External nose  normal appearance, nares patent, no nasal discharge, Mouth and Throat, Oral cavity appearance normal, Breath odor normal, Lips, Appearance normal

## 2021-03-09 NOTE — HPI-TODAY'S VISIT:
62 y/o male presenting for f/u. He was last seen Feb 19 with abdominal pain/nausea. He had an EGD performed on 2/22/2020. He was found to have gastritis--bx were negative for any H.Pylori or metaplasia. He also had a CT scan performed which demonstreated circumferential wall thickening in the pylorus c/w a hx of gastritis. He is here today for f/u.   He has not had any further nausea/emesis/or pain. He has been taking 2 dicyclomine per day and feels well.   2/19/2020 62 y/o male presenting for f/u. He was last seen in the clinic in Dec 2020. Since last saturday he has been having abdominal pain/spasm/bloating. He has not had any nausea/emesis. He had also lost about 11 lbs.  I placed him on dicyclomine PRN and he has been taking it.   12/15/2020 61 y/o male presenting for f/u. He has a prior hx of colon cancer and constipation. He has a bowel movement about every 2 days and does not take any medications currently. He was on benefiber and Miralax and stopped taking those. He does not have any rectal bleeding. He does have intermittent abdominal pain. He does take dicyclomine once daily; he is unsure if it helps his pain.   He has taken Align in the past and will go back on it. He was taking it for gas/bloating and it worked well.   9/4/2020 61 y/o male presenting for f/u. He does have a hx of colon cancer in 2014. He also had a tubular adenoma removed in 2019. He was taken for a colonoscopy recently (8/2020). It was normal. He is here today for f/u.   He does intermittently have constipation vs diarrhea. He cannot find any specific foods that cause diarrhea. He is taking Linzess; however he has stomach pain with it. He takes it PRN if he feels constipated. He has tried to take Metamucil every day. He sometimes doesn't like to take it since it causes feelings of incomplete evacuation.   He was taking Align in the past but could not tell any difference in taking it.  ----- [facility] [place], [place]osis.  [facility] [place]  8/[place]--sigmoid ta, hyperplastic polyp.

## 2021-03-09 NOTE — PHYSICAL EXAM GASTROINTESTINAL
soft, nontender, nondistended , normal bowel sounds , Abdomen , soft, nontender, nondistended , no guarding or rigidity , no masses palpable , normal bowel sounds , Liver and Spleen , no hepatomegaly present , no hepatosplenomegaly , liver nontender , spleen not palpable

## 2021-03-09 NOTE — PHYSICAL EXAM CONSTITUTIONAL:
alert ,  pleasant, well nourished, in no acute distress , well developed, well nourished , in no acute distress , ambulating without difficulty , normal communication ability

## 2021-03-25 ENCOUNTER — OFFICE VISIT (OUTPATIENT)
Dept: URBAN - METROPOLITAN AREA CLINIC 107 | Facility: CLINIC | Age: 61
End: 2021-03-25

## 2021-05-12 ENCOUNTER — WEB ENCOUNTER (OUTPATIENT)
Dept: URBAN - METROPOLITAN AREA CLINIC 107 | Facility: CLINIC | Age: 61
End: 2021-05-12

## 2021-05-12 ENCOUNTER — OFFICE VISIT (OUTPATIENT)
Dept: URBAN - METROPOLITAN AREA CLINIC 107 | Facility: CLINIC | Age: 61
End: 2021-05-12
Payer: MEDICARE

## 2021-05-12 VITALS
TEMPERATURE: 98.2 F | HEIGHT: 73 IN | SYSTOLIC BLOOD PRESSURE: 154 MMHG | HEART RATE: 70 BPM | DIASTOLIC BLOOD PRESSURE: 80 MMHG | RESPIRATION RATE: 18 BRPM | WEIGHT: 224 LBS | BODY MASS INDEX: 29.69 KG/M2

## 2021-05-12 DIAGNOSIS — C18.9 COLON CANCER: ICD-10-CM

## 2021-05-12 DIAGNOSIS — K59.01 CONSTIPATION: ICD-10-CM

## 2021-05-12 DIAGNOSIS — K21.9 GASTROESOPHAGEAL REFLUX DISEASE, UNSPECIFIED WHETHER ESOPHAGITIS PRESENT: ICD-10-CM

## 2021-05-12 DIAGNOSIS — R11.2 INTRACTABLE VOMITING WITH NAUSEA, UNSPECIFIED VOMITING TYPE: ICD-10-CM

## 2021-05-12 DIAGNOSIS — R14.0 BLOATING: ICD-10-CM

## 2021-05-12 DIAGNOSIS — K63.89 OTHER SPECIFIED DISEASES OF INTESTINE: ICD-10-CM

## 2021-05-12 PROCEDURE — 99213 OFFICE O/P EST LOW 20 MIN: CPT | Performed by: INTERNAL MEDICINE

## 2021-05-12 RX ORDER — DICYCLOMINE HYDROCHLORIDE 20 MG/1
TAKE 2 TABLET 4 TIMES DAILY PRN TABLET ORAL
Qty: 240 | Status: ACTIVE | COMMUNITY
Start: 2019-11-13

## 2021-05-12 RX ORDER — ASPIRIN 325 MG
1 CAPSULE TABLET ORAL ONCE A DAY
Status: ACTIVE | COMMUNITY

## 2021-05-12 RX ORDER — RAMIPRIL 10 MG/1
TAKE 1 CAPSULE ONCE DAILY CAPSULE ORAL
Refills: 0 | Status: ACTIVE | COMMUNITY
Start: 2019-02-13

## 2021-05-12 RX ORDER — OMEPRAZOLE 20 MG/1
TAKE 1 CAPSULE DAILY CAPSULE, DELAYED RELEASE ORAL ONCE A DAY
Refills: 0 | Status: ACTIVE | COMMUNITY
Start: 2019-12-03

## 2021-05-12 RX ORDER — BIFIDOBACTERIUM LONGUM 10MM CELL
AS DIRECTED CAPSULE ORAL
Status: ACTIVE | COMMUNITY

## 2021-05-12 RX ORDER — METFORMIN HYDROCHLORIDE 500 MG/1
1 TABLET WITH A MEAL TABLET, FILM COATED ORAL ONCE A DAY
Status: ACTIVE | COMMUNITY

## 2021-05-12 RX ORDER — FAMOTIDINE 20 MG/1
TAKE 1 TABLET DAILY TABLET ORAL
Refills: 0 | Status: ACTIVE | COMMUNITY
Start: 2019-11-07

## 2021-05-12 RX ORDER — SALICYLIC ACID 3 G/100G
1 TABLET LOTION TOPICAL ONCE A DAY
Status: ACTIVE | COMMUNITY

## 2021-05-12 RX ORDER — GABAPENTIN 300 MG/1
TAKE 1 CAPSULE CAPSULE ORAL
Refills: 0 | Status: ACTIVE | COMMUNITY
Start: 2020-01-02

## 2021-05-12 RX ORDER — HYDROCHLOROTHIAZIDE 12.5 MG/1
TAKE 1 CAPSULE ONCE DAILY CAPSULE ORAL
Refills: 0 | Status: ACTIVE | COMMUNITY
Start: 2019-02-22

## 2021-05-12 RX ORDER — TRAMADOL HYDROCHLORIDE AND ACETAMINOPHEN 37.5; 325 MG/1; MG/1
TAKE 2 TABLETS EVERY 4-6 HOURS AS NEEDED FOR PAIN RELIEF TABLET, FILM COATED ORAL
Refills: 0 | Status: ACTIVE | COMMUNITY
Start: 2019-01-24

## 2021-05-12 RX ORDER — PSYLLIUM SEED
AS DIRECTED PACKET (EA) ORAL
Status: ACTIVE | COMMUNITY

## 2021-05-12 RX ORDER — LIDOCAINE HYDROCHLORIDE 30 MG/G
USE TOPICALLY AS DIRECTED CREAM TOPICAL
Refills: 0 | Status: ACTIVE | COMMUNITY

## 2021-05-12 NOTE — HPI-TODAY'S VISIT:
61-year-old male presenting for follow-up.  He was last seen in the clinic on March 9, 2021. He has been taking his miralax daily but it feels like he has incomplete evacaution.   Patient is without any abdominal complaints. No dysphagia, heartburn, regurgitation, unintentional weight loss, nausea, vomiting, hematemesis or melena. Bowels are moving regularly without blood per rectum. No complaints of bloating. No jaundice, icterus.  3/9/2021 60 y/o male presenting for f/u. He was last seen Feb 19 with abdominal pain/nausea. He had an EGD performed on 2/22/2020. He was found to have gastritis--bx were negative for any H.Pylori or metaplasia. He also had a CT scan performed which demonstreated circumferential wall thickening in the pylorus c/w a hx of gastritis. He is here today for f/u.   He has not had any further nausea/emesis/or pain. He has been taking 2 dicyclomine per day and feels well.   2/19/2020 60 y/o male presenting for f/u. He was last seen in the clinic in Dec 2020. Since last saturday he has been having abdominal pain/spasm/bloating. He has not had any nausea/emesis. He had also lost about 11 lbs.  I placed him on dicyclomine PRN and he has been taking it.   12/15/2020 59 y/o male presenting for f/u. He has a prior hx of colon cancer and constipation. He has a bowel movement about every 2 days and does not take any medications currently. He was on benefiber and Miralax and stopped taking those. He does not have any rectal bleeding. He does have intermittent abdominal pain. He does take dicyclomine once daily; he is unsure if it helps his pain.   He has taken Align in the past and will go back on it. He was taking it for gas/bloating and it worked well.   9/4/2020 59 y/o male presenting for f/u. He does have a hx of colon cancer in 2014. He also had a tubular adenoma removed in 2019. He was taken for a colonoscopy recently (8/2020). It was normal. He is here today for f/u.   He does intermittently have constipation vs diarrhea. He cannot find any specific foods that cause diarrhea. He is taking Linzess; however he has stomach pain with it. He takes it PRN if he feels constipated. He has tried to take Metamucil every day. He sometimes doesn't like to take it since it causes feelings of incomplete evacuation.   He was taking Align in the past but could not tell any difference in taking it.  [place] [facility] [place], [place]  [facility] [place]sigmoid ta, hyperplastic polyp. 60 y/o male presenting for f/u. He was last seen in the clinic in Dec 2020. Since last saturday he has been having abdominal pain/spasm/bloating. He has not had any nausea/emesis. He had also lost about 11 lbs.  I placed him on dicyclomine PRN and he has been taking it.   12/15/2020 59 y/o male presenting for f/u. He has a prior hx of colon cancer and constipation. He has a bowel movement about every 2 days and does not take any medications currently. He was on benefiber and Miralax and stopped taking those. He does not have any rectal bleeding. He does have intermittent abdominal pain. He does take dicyclomine once daily; he is unsure if it helps his pain.   He has taken Align in the past and will go back on it. He was taking it for gas/bloating and it worked well.   9/4/2020 59 y/o male presenting for f/u. He does have a hx of colon cancer in 2014. He also had a tubular adenoma removed in 2019. He was taken for a colonoscopy recently (8/2020). It was normal. He is here today for f/u.   He does intermittently have constipation vs diarrhea. He cannot find any specific foods that cause diarrhea. He is taking Linzess; however he has stomach pain with it. He takes it PRN if he feels constipated. He has tried to take Metamucil every day. He sometimes doesn't like to take it since it causes feelings of incomplete evacuation.   He was taking Align in the past but could not tell any difference in taking it.  [place] [facility] [place], [place]osis.  [facility] [place]sigmoid ta, hyperplastic polyp. Ryanne Cote is a 32year old female. HPI:   Patient presents with:  Physical  Patient presents to establish care/for CPX. Diet: Tries to eat healthy.   Exercise:  Lower back pain makes it hard for her to exercise  Corrective lenses: None  Last pap s tablet by mouth every 8 (eight) hours as needed for Pain., Disp: 90 tablet, Rfl: 0  •  ALPRAZolam 0.25 MG Oral Tab, Take 1 tablet (0.25 mg total) by mouth 2 (two) times daily as needed for Sleep., Disp: 60 tablet, Rfl: 1  Medical:  has no past medical hist injury 10+ years ago (high school softball injury - compression fractures in lumbar spine). For past several months, she has now been having thoracic back pain as well. On examination, tender to palpation in thoracic and lumbar spines.   Positive bilatera

## 2021-09-15 ENCOUNTER — OFFICE VISIT (OUTPATIENT)
Dept: URBAN - METROPOLITAN AREA CLINIC 107 | Facility: CLINIC | Age: 61
End: 2021-09-15
Payer: MEDICARE

## 2021-09-15 VITALS
BODY MASS INDEX: 30.88 KG/M2 | HEART RATE: 72 BPM | TEMPERATURE: 97.7 F | HEIGHT: 73 IN | RESPIRATION RATE: 18 BRPM | WEIGHT: 233 LBS | SYSTOLIC BLOOD PRESSURE: 142 MMHG | DIASTOLIC BLOOD PRESSURE: 74 MMHG

## 2021-09-15 DIAGNOSIS — K21.9 GASTROESOPHAGEAL REFLUX DISEASE, UNSPECIFIED WHETHER ESOPHAGITIS PRESENT: ICD-10-CM

## 2021-09-15 DIAGNOSIS — R14.0 BLOATING: ICD-10-CM

## 2021-09-15 DIAGNOSIS — R11.2 INTRACTABLE VOMITING WITH NAUSEA, UNSPECIFIED VOMITING TYPE: ICD-10-CM

## 2021-09-15 DIAGNOSIS — K59.01 CONSTIPATION: ICD-10-CM

## 2021-09-15 DIAGNOSIS — C18.9 COLON CANCER: ICD-10-CM

## 2021-09-15 DIAGNOSIS — K63.89 OTHER SPECIFIED DISEASES OF INTESTINE: ICD-10-CM

## 2021-09-15 PROCEDURE — 99214 OFFICE O/P EST MOD 30 MIN: CPT | Performed by: INTERNAL MEDICINE

## 2021-09-15 RX ORDER — METFORMIN HYDROCHLORIDE 500 MG/1
1 TABLET WITH A MEAL TABLET, FILM COATED ORAL ONCE A DAY
Status: ACTIVE | COMMUNITY

## 2021-09-15 RX ORDER — RIFAXIMIN 550 MG/1
1 TABLET TABLET ORAL THREE TIMES A DAY
Qty: 42 TABLET | Refills: 2 | OUTPATIENT
Start: 2021-09-15 | End: 2021-10-27

## 2021-09-15 RX ORDER — RAMIPRIL 10 MG/1
TAKE 1 CAPSULE ONCE DAILY CAPSULE ORAL
Refills: 0 | Status: ACTIVE | COMMUNITY
Start: 2019-02-13

## 2021-09-15 RX ORDER — DICYCLOMINE HYDROCHLORIDE 20 MG/1
TAKE 2 TABLET 4 TIMES DAILY PRN TABLET ORAL
Qty: 240 | Status: ACTIVE | COMMUNITY
Start: 2019-11-13

## 2021-09-15 RX ORDER — OMEPRAZOLE 20 MG/1
TAKE 1 CAPSULE DAILY CAPSULE, DELAYED RELEASE ORAL ONCE A DAY
Refills: 0 | Status: ACTIVE | COMMUNITY
Start: 2019-12-03

## 2021-09-15 RX ORDER — ATORVASTATIN CALCIUM 80 MG/1
1 TABLET TABLET, FILM COATED ORAL
Status: ACTIVE | COMMUNITY

## 2021-09-15 RX ORDER — SALICYLIC ACID 3 G/100G
1 TABLET LOTION TOPICAL ONCE A DAY
Status: ACTIVE | COMMUNITY

## 2021-09-15 RX ORDER — LIDOCAINE HYDROCHLORIDE 30 MG/G
USE TOPICALLY AS DIRECTED CREAM TOPICAL
Refills: 0 | Status: ACTIVE | COMMUNITY

## 2021-09-15 RX ORDER — BIFIDOBACTERIUM LONGUM 10MM CELL
AS DIRECTED CAPSULE ORAL
Status: ACTIVE | COMMUNITY

## 2021-09-15 RX ORDER — ASPIRIN 325 MG
1 CAPSULE TABLET ORAL ONCE A DAY
Status: ACTIVE | COMMUNITY

## 2021-09-15 RX ORDER — TRAMADOL HYDROCHLORIDE AND ACETAMINOPHEN 37.5; 325 MG/1; MG/1
TAKE 2 TABLETS EVERY 4-6 HOURS AS NEEDED FOR PAIN RELIEF TABLET, FILM COATED ORAL
Refills: 0 | Status: ACTIVE | COMMUNITY
Start: 2019-01-24

## 2021-09-15 RX ORDER — PSYLLIUM SEED
AS DIRECTED PACKET (EA) ORAL
Status: ACTIVE | COMMUNITY

## 2021-09-15 RX ORDER — HYDROCHLOROTHIAZIDE 12.5 MG/1
TAKE 1 CAPSULE ONCE DAILY CAPSULE ORAL
Refills: 0 | Status: ACTIVE | COMMUNITY
Start: 2019-02-22

## 2021-09-15 RX ORDER — GABAPENTIN 300 MG/1
TAKE 1 CAPSULE CAPSULE ORAL
Refills: 0 | Status: ACTIVE | COMMUNITY
Start: 2020-01-02

## 2021-09-15 RX ORDER — FAMOTIDINE 20 MG/1
TAKE 1 TABLET DAILY TABLET ORAL
Refills: 0 | Status: ACTIVE | COMMUNITY
Start: 2019-11-07

## 2021-09-15 NOTE — HPI-TODAY'S VISIT:
61-year-old male presenting for follow-up.  He was last seen in the clinic on May 12, 2021. He is here today with a c/o bloating. He has taken dicyclomine but it makes him drowsy. He has also tried benefiber but it makes him have an upset stomach. He has about 2 bm every 2-3 days. His main complaint is bloating. He denies any belching/burping.   5/12/21 61-year-old male presenting for follow-up.  He was last seen in the clinic on March 9, 2021. He has been taking his miralax daily but it feels like he has incomplete evacaution.   Patient is without any abdominal complaints. No dysphagia, heartburn, regurgitation, unintentional weight loss, nausea, vomiting, hematemesis or melena. Bowels are moving regularly without blood per rectum. No complaints of bloating. No jaundice, icterus.  3/9/2021 60 y/o male presenting for f/u. He was last seen Feb 19 with abdominal pain/nausea. He had an EGD performed on 2/22/2020. He was found to have gastritis--bx were negative for any H.Pylori or metaplasia. He also had a CT scan performed which demonstreated circumferential wall thickening in the pylorus c/w a hx of gastritis. He is here today for f/u.   He has not had any further nausea/emesis/or pain. He has been taking 2 dicyclomine per day and feels well.   2/19/2020 60 y/o male presenting for f/u. He was last seen in the clinic in Dec 2020. Since last saturday he has been having abdominal pain/spasm/bloating. He has not had any nausea/emesis. He had also lost about 11 lbs.  I placed him on dicyclomine PRN and he has been taking it.   12/15/2020 59 y/o male presenting for f/u. He has a prior hx of colon cancer and constipation. He has a bowel movement about every 2 days and does not take any medications currently. He was on benefiber and Miralax and stopped taking those. He does not have any rectal bleeding. He does have intermittent abdominal pain. He does take dicyclomine once daily; he is unsure if it helps his pain.   He has taken Align in the past and will go back on it. He was taking it for gas/bloating and it worked well.   9/4/2020 59 y/o male presenting for f/u. He does have a hx of colon cancer in 2014. He also had a tubular adenoma removed in 2019. He was taken for a colonoscopy recently (8/2020). It was normal. He is here today for f/u.   He does intermittently have constipation vs diarrhea. He cannot find any specific foods that cause diarrhea. He is taking Linzess; however he has stomach pain with it. He takes it PRN if he feels constipated. He has tried to take Metamucil every day. He sometimes doesn't like to take it since it causes feelings of incomplete evacuation.   He was taking Align in the past but could not tell any difference in taking it.  ----- [facility] 8/2020--lipoma, anastomosis.  [facility]  8/2019--sigmoid ta, hyperplastic polyp. 60 y/o male presenting for f/u. He was last seen in the clinic in Dec 2020. Since last saturday he has been having abdominal pain/spasm/bloating. He has not had any nausea/emesis. He had also lost about 11 lbs.  I placed him on dicyclomine PRN and he has been taking it.   12/15/2020 59 y/o male presenting for f/u. He has a prior hx of colon cancer and constipation. He has a bowel movement about every 2 days and does not take any medications currently. He was on benefiber and Miralax and stopped taking those. He does not have any rectal bleeding. He does have intermittent abdominal pain. He does take dicyclomine once daily; he is unsure if it helps his pain.   He has taken Align in the past and will go back on it. He was taking it for gas/bloating and it worked well.   9/4/2020 59 y/o male presenting for f/u. He does have a hx of colon cancer in 2014. He also had a tubular adenoma removed in 2019. He was taken for a colonoscopy recently (8/2020). It was normal. He is here today for f/u.   He does intermittently have constipation vs diarrhea. He cannot find any specific foods that cause diarrhea. He is taking Linzess; however he has stomach pain with it. He takes it PRN if he feels constipated. He has tried to take Metamucil every day. He sometimes doesn't like to take it since it causes feelings of incomplete evacuation.   He was taking Align in the past but could not tell any difference in taking it.  [place] [facility] [place]  [facility] [place]sigmoid ta, hyperplastic polyp. 60 y/o male presenting for f/u. He was last seen Feb 19 with abdominal pain/nausea. He had an EGD performed on 2/22/2020. He was found to have gastritis--bx were negative for any H.Pylori or metaplasia. He also had a CT scan performed which demonstreated circumferential wall thickening in the pylorus c/w a hx of gastritis. He is here today for f/u.   He has not had any further nausea/emesis/or pain. He has been taking 2 dicyclomine per day and feels well.   2/19/2020 60 y/o male presenting for f/u. He was last seen in the clinic in Dec 2020. Since last saturday he has been having abdominal pain/spasm/bloating. He has not had any nausea/emesis. He had also lost about 11 lbs.  I placed him on dicyclomine PRN and he has been taking it.   12/15/2020 59 y/o male presenting for f/u. He has a prior hx of colon cancer and constipation. He has a bowel movement about every 2 days and does not take any medications currently. He was on benefiber and Miralax and stopped taking those. He does not have any rectal bleeding. He does have intermittent abdominal pain. He does take dicyclomine once daily; he is unsure if it helps his pain.   He has taken Align in the past and will go back on it. He was taking it for gas/bloating and it worked well.   9/4/2020 59 y/o male presenting for f/u. He does have a hx of colon cancer in 2014. He also had a tubular adenoma removed in 2019. He was taken for a colonoscopy recently (8/2020). It was normal. He is here today for f/u.   He does intermittently have constipation vs diarrhea. He cannot find any specific foods that cause diarrhea. He is taking Linzess; however he has stomach pain with it. He takes it PRN if he feels constipated. He has tried to take Metamucil every day. He sometimes doesn't like to take it since it causes feelings of incomplete evacuation.   He was taking Align in the past but could not tell any difference in taking it.  [place] [facility] [place], [place]  [facility] [place]sigmoid ta, hyperplastic polyp.

## 2022-03-08 ENCOUNTER — OFFICE VISIT (OUTPATIENT)
Dept: URBAN - METROPOLITAN AREA CLINIC 107 | Facility: CLINIC | Age: 62
End: 2022-03-08
Payer: MEDICARE

## 2022-03-08 VITALS
RESPIRATION RATE: 18 BRPM | TEMPERATURE: 97.8 F | HEIGHT: 73 IN | HEART RATE: 77 BPM | BODY MASS INDEX: 30.88 KG/M2 | DIASTOLIC BLOOD PRESSURE: 82 MMHG | SYSTOLIC BLOOD PRESSURE: 144 MMHG | WEIGHT: 233 LBS

## 2022-03-08 DIAGNOSIS — K21.9 GASTROESOPHAGEAL REFLUX DISEASE, UNSPECIFIED WHETHER ESOPHAGITIS PRESENT: ICD-10-CM

## 2022-03-08 DIAGNOSIS — R11.2 INTRACTABLE VOMITING WITH NAUSEA, UNSPECIFIED VOMITING TYPE: ICD-10-CM

## 2022-03-08 DIAGNOSIS — R14.0 BLOATING: ICD-10-CM

## 2022-03-08 DIAGNOSIS — K59.01 CONSTIPATION: ICD-10-CM

## 2022-03-08 DIAGNOSIS — K63.89 OTHER SPECIFIED DISEASES OF INTESTINE: ICD-10-CM

## 2022-03-08 DIAGNOSIS — K58.0 IRRITABLE BOWEL SYNDROME WITH DIARRHEA: ICD-10-CM

## 2022-03-08 DIAGNOSIS — C18.9 COLON CANCER: ICD-10-CM

## 2022-03-08 PROBLEM — 14760008 CONSTIPATION: Status: ACTIVE | Noted: 2020-09-04

## 2022-03-08 PROBLEM — 85919009 DISORDER OF INTESTINE: Status: ACTIVE | Noted: 2020-12-15

## 2022-03-08 PROBLEM — 197125005: Status: ACTIVE | Noted: 2022-03-08

## 2022-03-08 PROBLEM — 235595009: Status: ACTIVE | Noted: 2021-02-19

## 2022-03-08 PROBLEM — 363406005 MALIGNANT TUMOR OF COLON: Status: ACTIVE | Noted: 2020-09-04

## 2022-03-08 PROBLEM — 116289008: Status: ACTIVE | Noted: 2020-12-15

## 2022-03-08 PROCEDURE — 99214 OFFICE O/P EST MOD 30 MIN: CPT | Performed by: INTERNAL MEDICINE

## 2022-03-08 RX ORDER — BIFIDOBACTERIUM LONGUM 10MM CELL
AS DIRECTED CAPSULE ORAL
Status: DISCONTINUED | COMMUNITY

## 2022-03-08 RX ORDER — OMEPRAZOLE 20 MG/1
TAKE 1 CAPSULE DAILY CAPSULE, DELAYED RELEASE ORAL ONCE A DAY
Refills: 0 | Status: ACTIVE | COMMUNITY
Start: 2019-12-03

## 2022-03-08 RX ORDER — ASPIRIN 325 MG
1 CAPSULE TABLET ORAL ONCE A DAY
Status: ACTIVE | COMMUNITY

## 2022-03-08 RX ORDER — METFORMIN HYDROCHLORIDE 500 MG/1
1 TABLET WITH A MEAL TABLET, FILM COATED ORAL ONCE A DAY
Status: DISCONTINUED | COMMUNITY

## 2022-03-08 RX ORDER — HYDROCHLOROTHIAZIDE 12.5 MG/1
TAKE 1 CAPSULE ONCE DAILY CAPSULE ORAL
Refills: 0 | Status: ACTIVE | COMMUNITY
Start: 2019-02-22

## 2022-03-08 RX ORDER — RAMIPRIL 10 MG/1
TAKE 1 CAPSULE ONCE DAILY CAPSULE ORAL
Refills: 0 | Status: ACTIVE | COMMUNITY
Start: 2019-02-13

## 2022-03-08 RX ORDER — DICYCLOMINE HYDROCHLORIDE 20 MG/1
TAKE 2 TABLET 4 TIMES DAILY PRN TABLET ORAL
Qty: 240 | Status: DISCONTINUED | COMMUNITY
Start: 2019-11-13

## 2022-03-08 RX ORDER — SALICYLIC ACID 3 G/100G
1 TABLET LOTION TOPICAL ONCE A DAY
Status: ACTIVE | COMMUNITY

## 2022-03-08 RX ORDER — GABAPENTIN 300 MG/1
TAKE 1 CAPSULE CAPSULE ORAL
Refills: 0 | Status: ACTIVE | COMMUNITY
Start: 2020-01-02

## 2022-03-08 RX ORDER — SITAGLIPTIN 100 MG/1
1 TABLET TABLET, FILM COATED ORAL ONCE A DAY
Status: ACTIVE | COMMUNITY

## 2022-03-08 RX ORDER — RIFAXIMIN 550 MG/1
1 TABLET TABLET ORAL THREE TIMES A DAY
Qty: 42 TABLET | Refills: 2 | OUTPATIENT
Start: 2022-03-08 | End: 2022-04-19

## 2022-03-08 RX ORDER — LIDOCAINE HYDROCHLORIDE 30 MG/G
USE TOPICALLY AS DIRECTED CREAM TOPICAL
Refills: 0 | Status: ACTIVE | COMMUNITY

## 2022-03-08 RX ORDER — PSYLLIUM SEED
AS DIRECTED PACKET (EA) ORAL
Status: ACTIVE | COMMUNITY

## 2022-03-08 RX ORDER — TRAMADOL HYDROCHLORIDE AND ACETAMINOPHEN 37.5; 325 MG/1; MG/1
TAKE 2 TABLETS EVERY 4-6 HOURS AS NEEDED FOR PAIN RELIEF TABLET, FILM COATED ORAL
Refills: 0 | Status: ACTIVE | COMMUNITY
Start: 2019-01-24

## 2022-03-08 RX ORDER — FAMOTIDINE 20 MG/1
TAKE 1 TABLET DAILY TABLET ORAL
Refills: 0 | Status: ACTIVE | COMMUNITY
Start: 2019-11-07

## 2022-03-08 RX ORDER — ATORVASTATIN CALCIUM 80 MG/1
1 TABLET TABLET, FILM COATED ORAL
Status: ACTIVE | COMMUNITY

## 2022-03-08 NOTE — HPI-TODAY'S VISIT:
62-year-old male presenting for follow-up.  He was last seen in clinic in September 2021. His main complaint today is bloating. He does have chronic idiopathic constipation but takes smooth move tea every few days.  This does induce a bowel movement.  He denies any rectal bleeding or weight loss.  He denies any chronic abdominal pain.  9/15/21 61-year-old male presenting for follow-up.  He was last seen in the clinic on May 12, 2021. He is here today with a c/o bloating. He has taken dicyclomine but it makes him drowsy. He has also tried benefiber but it makes him have an upset stomach. He has about 2 bm every 2-3 days. His main complaint is bloating. He denies any belching/burping.   5/12/21 61-year-old male presenting for follow-up.  He was last seen in the clinic on March 9, 2021. He has been taking his miralax daily but it feels like he has incomplete evacaution.   Patient is without any abdominal complaints. No dysphagia, heartburn, regurgitation, unintentional weight loss, nausea, vomiting, hematemesis or melena. Bowels are moving regularly without blood per rectum. No complaints of bloating. No jaundice, icterus.  3/9/2021 60 y/o male presenting for f/u. He was last seen Feb 19 with abdominal pain/nausea. He had an EGD performed on 2/22/2020. He was found to have gastritis--bx were negative for any H.Pylori or metaplasia. He also had a CT scan performed which demonstreated circumferential wall thickening in the pylorus c/w a hx of gastritis. He is here today for f/u.   He has not had any further nausea/emesis/or pain. He has been taking 2 dicyclomine per day and feels well.   2/19/2020 60 y/o male presenting for f/u. He was last seen in the clinic in Dec 2020. Since last saturday he has been having abdominal pain/spasm/bloating. He has not had any nausea/emesis. He had also lost about 11 lbs.  I placed him on dicyclomine PRN and he has been taking it.   12/15/2020 61 y/o male presenting for f/u. He has a prior hx of colon cancer and constipation. He has a bowel movement about every 2 days and does not take any medications currently. He was on benefiber and Miralax and stopped taking those. He does not have any rectal bleeding. He does have intermittent abdominal pain. He does take dicyclomine once daily; he is unsure if it helps his pain.   He has taken Align in the past and will go back on it. He was taking it for gas/bloating and it worked well.   9/4/2020 61 y/o male presenting for f/u. He does have a hx of colon cancer in 2014. He also had a tubular adenoma removed in 2019. He was taken for a colonoscopy recently (8/2020). It was normal. He is here today for f/u.   He does intermittently have constipation vs diarrhea. He cannot find any specific foods that cause diarrhea. He is taking Linzess; however he has stomach pain with it. He takes it PRN if he feels constipated. He has tried to take Metamucil every day. He sometimes doesn't like to take it since it causes feelings of incomplete evacuation.   He was taking Align in the past but could not tell any difference in taking it.  ----- [facility] 8/2020--lipoma, anastomosis.  [facility]  8/2019--sigmoid ta, hyperplastic polyp. 60 y/o male presenting for f/u. He was last seen in the clinic in Dec 2020. Since last saturday he has been having abdominal pain/spasm/bloating. He has not had any nausea/emesis. He had also lost about 11 lbs.  I placed him on dicyclomine PRN and he has been taking it.   12/15/2020 61 y/o male presenting for f/u. He has a prior hx of colon cancer and constipation. He has a bowel movement about every 2 days and does not take any medications currently. He was on benefiber and Miralax and stopped taking those. He does not have any rectal bleeding. He does have intermittent abdominal pain. He does take dicyclomine once daily; he is unsure if it helps his pain.   He has taken Align in the past and will go back on it. He was taking it for gas/bloating and it worked well.   9/4/2020 61 y/o male presenting for f/u. He does have a hx of colon cancer in 2014. He also had a tubular adenoma removed in 2019. He was taken for a colonoscopy recently (8/2020). It was normal. He is here today for f/u.   He does intermittently have constipation vs diarrhea. He cannot find any specific foods that cause diarrhea. He is taking Linzess; however he has stomach pain with it. He takes it PRN if he feels constipated. He has tried to take Metamucil every day. He sometimes doesn't like to take it since it causes feelings of incomplete evacuation.   He was taking Align in the past but could not tell any difference in taking it.  ----- [facility] [place]--lipoma, anastomosis.  [facility] [place]  8/2019--sigmoid ta, hyperplastic polyp. 60 y/o male presenting for f/u. He was last seen Feb 19 with abdominal pain/nausea. He had an EGD performed on 2/22/2020. He was found to have gastritis--bx were negative for any H.Pylori or metaplasia. He also had a CT scan performed which demonstreated circumferential wall thickening in the pylorus c/w a hx of gastritis. He is here today for f/u.   He has not had any further nausea/emesis/or pain. He has been taking 2 dicyclomine per day and feels well.   2/19/2020 60 y/o male presenting for f/u. He was last seen in the clinic in Dec 2020. Since last saturday he has been having abdominal pain/spasm/bloating. He has not had any nausea/emesis. He had also lost about 11 lbs.  I placed him on dicyclomine PRN and he has been taking it.   12/15/2020 61 y/o male presenting for f/u. He has a prior hx of colon cancer and constipation. He has a bowel movement about every 2 days and does not take any medications currently. He was on benefiber and Miralax and stopped taking those. He does not have any rectal bleeding. He does have intermittent abdominal pain. He does take dicyclomine once daily; he is unsure if it helps his pain.   He has taken Align in the past and will go back on it. He was taking it for gas/bloating and it worked well.   9/4/2020 61 y/o male presenting for f/u. He does have a hx of colon cancer in 2014. He also had a tubular adenoma removed in 2019. He was taken for a colonoscopy recently (8/2020). It was normal. He is here today for f/u.   He does intermittently have constipation vs diarrhea. He cannot find any specific foods that cause diarrhea. He is taking Linzess; however he has stomach pain with it. He takes it PRN if he feels constipated. He has tried to take Metamucil every day. He sometimes doesn't like to take it since it causes feelings of incomplete evacuation.   He was taking Align in the past but could not tell any difference in taking it.  ----- [facility] [place] 8/2020--lipoma, anastomosis.  [facility] [place]  8/2019--sigmoid ta, hyperplastic polyp. 61-year-old male presenting for follow-up.  He was last seen in the clinic on March 9, 2021. He has been taking his miralax daily but it feels like he has incomplete evacaution.   Patient is without any abdominal complaints. No dysphagia, heartburn, regurgitation, unintentional weight loss, nausea, vomiting, hematemesis or melena. Bowels are moving regularly without blood per rectum. No complaints of bloating. No jaundice, icterus.  3/9/2021 60 y/o male presenting for f/u. He was last seen Feb 19 with abdominal pain/nausea. He had an EGD performed on 2/22/2020. He was found to have gastritis--bx were negative for any H.Pylori or metaplasia. He also had a CT scan performed which demonstreated circumferential wall thickening in the pylorus c/w a hx of gastritis. He is here today for f/u.   He has not had any further nausea/emesis/or pain. He has been taking 2 dicyclomine per day and feels well.   2/19/2020 60 y/o male presenting for f/u. He was last seen in the clinic in Dec 2020. Since last saturday he has been having abdominal pain/spasm/bloating. He has not had any nausea/emesis. He had also lost about 11 lbs.  I placed him on dicyclomine PRN and he has been taking it.   12/15/2020 61 y/o male presenting for f/u. He has a prior hx of colon cancer and constipation. He has a bowel movement about every 2 days and does not take any medications currently. He was on benefiber and Miralax and stopped taking those. He does not have any rectal bleeding. He does have intermittent abdominal pain. He does take dicyclomine once daily; he is unsure if it helps his pain.   He has taken Align in the past and will go back on it. He was taking it for gas/bloating and it worked well.   9/4/2020 61 y/o male presenting for f/u. He does have a hx of colon cancer in 2014. He also had a tubular adenoma removed in 2019. He was taken for a colonoscopy recently (8/2020). It was normal. He is here today for f/u.   He does intermittently have constipation vs diarrhea. He cannot find any specific foods that cause diarrhea. He is taking Linzess; however he has stomach pain with it. He takes it PRN if he feels constipated. He has tried to take Metamucil every day. He sometimes doesn't like to take it since it causes feelings of incomplete evacuation.   He was taking Align in the past but could not tell any difference in taking it.  ----- [facility] 8/2020--lipoma, anastomosis.  [facility] [place]  8/2019--sigmoid ta, hyperplastic polyp. 60 y/o male presenting for f/u. He was last seen in the clinic in Dec 2020. Since last saturday he has been having abdominal pain/spasm/bloating. He has not had any nausea/emesis. He had also lost about 11 lbs.  I placed him on dicyclomine PRN and he has been taking it.   12/15/2020 61 y/o male presenting for f/u. He has a prior hx of colon cancer and constipation. He has a bowel movement about every 2 days and does not take any medications currently. He was on benefiber and Miralax and stopped taking those. He does not have any rectal bleeding. He does have intermittent abdominal pain. He does take dicyclomine once daily; he is unsure if it helps his pain.   He has taken Align in the past and will go back on it. He was taking it for gas/bloating and it worked well.   9/4/2020 61 y/o male presenting for f/u. He does have a hx of colon cancer in 2014. He also had a tubular adenoma removed in 2019. He was taken for a colonoscopy recently (8/2020). It was normal. He is here today for f/u.   He does intermittently have constipation vs diarrhea. He cannot find any specific foods that cause diarrhea. He is taking Linzess; however he has stomach pain with it. He takes it PRN if he feels constipated. He has tried to take Metamucil every day. He sometimes doesn't like to take it since it causes feelings of incomplete evacuation.   He was taking Align in the past but could not tell any difference in taking it.  ----- [facility] [place] 8/2020--lipoma, anastomosis.  [facility] [place]  8/2019--sigmoid ta, hyperplastic polyp.

## 2022-03-18 PROBLEM — Z85.038 HISTORY OF MALIGNANT NEOPLASM OF COLON: Status: ACTIVE | Noted: 2020-11-09

## 2022-03-19 PROBLEM — K21.9 GERD (GASTROESOPHAGEAL REFLUX DISEASE): Status: ACTIVE | Noted: 2020-11-09

## 2022-03-19 PROBLEM — K58.9 IBS (IRRITABLE BOWEL SYNDROME): Status: ACTIVE | Noted: 2020-11-09

## 2022-03-19 PROBLEM — E55.9 VITAMIN D DEFICIENCY: Status: ACTIVE | Noted: 2020-11-09

## 2022-03-19 PROBLEM — E66.9 OBESITY: Status: ACTIVE | Noted: 2020-11-09

## 2022-03-19 PROBLEM — E11.9 TYPE 2 DIABETES MELLITUS (HCC): Status: ACTIVE | Noted: 2020-11-09

## 2022-03-20 PROBLEM — G89.29 CHRONIC LOW BACK PAIN: Status: ACTIVE | Noted: 2020-11-09

## 2022-03-20 PROBLEM — M54.50 CHRONIC LOW BACK PAIN: Status: ACTIVE | Noted: 2020-11-09

## 2022-03-23 ENCOUNTER — TELEPHONE ENCOUNTER (OUTPATIENT)
Dept: URBAN - METROPOLITAN AREA CLINIC 113 | Facility: CLINIC | Age: 62
End: 2022-03-23

## 2023-04-25 ENCOUNTER — OFFICE VISIT (OUTPATIENT)
Dept: URBAN - METROPOLITAN AREA CLINIC 107 | Facility: CLINIC | Age: 63
End: 2023-04-25
Payer: MEDICARE

## 2023-04-25 VITALS
WEIGHT: 225.4 LBS | HEART RATE: 61 BPM | DIASTOLIC BLOOD PRESSURE: 73 MMHG | TEMPERATURE: 97.8 F | SYSTOLIC BLOOD PRESSURE: 137 MMHG | HEIGHT: 73 IN | RESPIRATION RATE: 18 BRPM | BODY MASS INDEX: 29.87 KG/M2

## 2023-04-25 DIAGNOSIS — K58.0 IRRITABLE BOWEL SYNDROME WITH DIARRHEA: ICD-10-CM

## 2023-04-25 DIAGNOSIS — K63.89 OTHER SPECIFIED DISEASES OF INTESTINE: ICD-10-CM

## 2023-04-25 DIAGNOSIS — R11.2 INTRACTABLE VOMITING WITH NAUSEA, UNSPECIFIED VOMITING TYPE: ICD-10-CM

## 2023-04-25 DIAGNOSIS — K21.9 GASTROESOPHAGEAL REFLUX DISEASE, UNSPECIFIED WHETHER ESOPHAGITIS PRESENT: ICD-10-CM

## 2023-04-25 DIAGNOSIS — C18.9 COLON CANCER: ICD-10-CM

## 2023-04-25 DIAGNOSIS — R14.0 BLOATING: ICD-10-CM

## 2023-04-25 DIAGNOSIS — K59.01 CONSTIPATION: ICD-10-CM

## 2023-04-25 PROCEDURE — 99214 OFFICE O/P EST MOD 30 MIN: CPT | Performed by: INTERNAL MEDICINE

## 2023-04-25 RX ORDER — SITAGLIPTIN 100 MG/1
1 TABLET TABLET, FILM COATED ORAL ONCE A DAY
Status: ACTIVE | COMMUNITY

## 2023-04-25 RX ORDER — ASPIRIN 325 MG
1 CAPSULE TABLET ORAL ONCE A DAY
Status: ACTIVE | COMMUNITY

## 2023-04-25 RX ORDER — OMEPRAZOLE 20 MG/1
TAKE 1 CAPSULE DAILY CAPSULE, DELAYED RELEASE ORAL ONCE A DAY
Refills: 0 | Status: ACTIVE | COMMUNITY
Start: 2019-12-03

## 2023-04-25 RX ORDER — GABAPENTIN 300 MG/1
TAKE 1 CAPSULE CAPSULE ORAL
Refills: 0 | Status: ACTIVE | COMMUNITY
Start: 2020-01-02

## 2023-04-25 RX ORDER — LIDOCAINE HYDROCHLORIDE 30 MG/G
USE TOPICALLY AS DIRECTED CREAM TOPICAL
Refills: 0 | Status: ACTIVE | COMMUNITY

## 2023-04-25 RX ORDER — TRAMADOL HYDROCHLORIDE AND ACETAMINOPHEN 37.5; 325 MG/1; MG/1
TAKE 2 TABLETS EVERY 4-6 HOURS AS NEEDED FOR PAIN RELIEF TABLET, FILM COATED ORAL
Refills: 0 | Status: ACTIVE | COMMUNITY
Start: 2019-01-24

## 2023-04-25 RX ORDER — RAMIPRIL 10 MG/1
TAKE 1 CAPSULE ONCE DAILY CAPSULE ORAL
Refills: 0 | Status: ACTIVE | COMMUNITY
Start: 2019-02-13

## 2023-04-25 RX ORDER — PSYLLIUM SEED
AS DIRECTED PACKET (EA) ORAL
Status: ON HOLD | COMMUNITY

## 2023-04-25 RX ORDER — ATORVASTATIN CALCIUM 80 MG/1
0.5 TABLET TABLET, FILM COATED ORAL
Status: ACTIVE | COMMUNITY

## 2023-04-25 RX ORDER — FAMOTIDINE 20 MG/1
TAKE 1 TABLET DAILY TABLET ORAL
Refills: 0 | Status: ACTIVE | COMMUNITY
Start: 2019-11-07

## 2023-04-25 RX ORDER — SALICYLIC ACID 3 G/100G
1 TABLET LOTION TOPICAL ONCE A DAY
Status: ACTIVE | COMMUNITY

## 2023-04-25 RX ORDER — HYDROCHLOROTHIAZIDE 12.5 MG/1
TAKE 1 CAPSULE ONCE DAILY CAPSULE ORAL
Refills: 0 | Status: ACTIVE | COMMUNITY
Start: 2019-02-22

## 2023-04-25 NOTE — HPI-TODAY'S VISIT:
64 y/o male presenting for f/u. He was last seen in March 2022. He has been seen for bloating and constipation.  He has still had some constipation. He did not tolerate Linzess.   EGD 2/2021--gastritis.  [facility] 8/2020--lipoma, anastomosis.  [facility]  8/2019--sigmoid ta, hyperplastic polyp. CT 2/2021--R hemicolectomy/gastritis.   3/28/22 62-year-old male presenting for follow-up.  He was last seen in clinic in September 2021. His main complaint today is bloating. He does have chronic idiopathic constipation but takes smooth move tea every few days.  This does induce a bowel movement.  He denies any rectal bleeding or weight loss.  He denies any chronic abdominal pain.  9/15/21 61-year-old male presenting for follow-up.  He was last seen in the clinic on May 12, 2021. He is here today with a c/o bloating. He has taken dicyclomine but it makes him drowsy. He has also tried benefiber but it makes him have an upset stomach. He has about 2 bm every 2-3 days. His main complaint is bloating. He denies any belching/burping.   5/12/21 61-year-old male presenting for follow-up.  He was last seen in the clinic on March 9, 2021. He has been taking his miralax daily but it feels like he has incomplete evacaution.   Patient is without any abdominal complaints. No dysphagia, heartburn, regurgitation, unintentional weight loss, nausea, vomiting, hematemesis or melena. Bowels are moving regularly without blood per rectum. No complaints of bloating. No jaundice, icterus.  3/9/2021 60 y/o male presenting for f/u. He was last seen Feb 19 with abdominal pain/nausea. He had an EGD performed on 2/22/2020. He was found to have gastritis--bx were negative for any H.Pylori or metaplasia. He also had a CT scan performed which demonstreated circumferential wall thickening in the pylorus c/w a hx of gastritis. He is here today for f/u.   He has not had any further nausea/emesis/or pain. He has been taking 2 dicyclomine per day and feels well.   2/19/2020 60 y/o male presenting for f/u. He was last seen in the clinic in Dec 2020. Since last saturday he has been having abdominal pain/spasm/bloating. He has not had any nausea/emesis. He had also lost about 11 lbs.  I placed him on dicyclomine PRN and he has been taking it.   12/15/2020 61 y/o male presenting for f/u. He has a prior hx of colon cancer and constipation. He has a bowel movement about every 2 days and does not take any medications currently. He was on benefiber and Miralax and stopped taking those. He does not have any rectal bleeding. He does have intermittent abdominal pain. He does take dicyclomine once daily; he is unsure if it helps his pain.   He has taken Align in the past and will go back on it. He was taking it for gas/bloating and it worked well.   9/4/2020 61 y/o male presenting for f/u. He does have a hx of colon cancer in 2014. He also had a tubular adenoma removed in 2019. He was taken for a colonoscopy recently (8/2020). It was normal. He is here today for f/u.   He does intermittently have constipation vs diarrhea. He cannot find any specific foods that cause diarrhea. He is taking Linzess; however he has stomach pain with it. He takes it PRN if he feels constipated. He has tried to take Metamucil every day. He sometimes doesn't like to take it since it causes feelings of incomplete evacuation.   He was taking Align in the past but could not tell any difference in taking it.  ----- [facility] 8/2020--lipoma, anastomosis.  [facility]  8/2019--sigmoid ta, hyperplastic polyp. 60 y/o male presenting for f/u. He was last seen in the clinic in Dec 2020. Since last saturday he has been having abdominal pain/spasm/bloating. He has not had any nausea/emesis. He had also lost about 11 lbs.  I placed him on dicyclomine PRN and he has been taking it.   12/15/2020 61 y/o male presenting for f/u. He has a prior hx of colon cancer and constipation. He has a bowel movement about every 2 days and does not take any medications currently. He was on benefiber and Miralax and stopped taking those. He does not have any rectal bleeding. He does have intermittent abdominal pain. He does take dicyclomine once daily; he is unsure if it helps his pain.   He has taken Align in the past and will go back on it. He was taking it for gas/bloating and it worked well.   9/4/2020 61 y/o male presenting for f/u. He does have a hx of colon cancer in 2014. He also had a tubular adenoma removed in 2019. He was taken for a colonoscopy recently (8/2020). It was normal. He is here today for f/u.   He does intermittently have constipation vs diarrhea. He cannot find any specific foods that cause diarrhea. He is taking Linzess; however he has stomach pain with it. He takes it PRN if he feels constipated. He has tried to take Metamucil every day. He sometimes doesn't like to take it since it causes feelings of incomplete evacuation.   He was taking Align in the past but could not tell any difference in taking it.  ----- [facility] 8/2020--lipoma, anastomosis.  [facility]  8/2019--sigmoid ta, hyperplastic polyp. 60 y/o male presenting for f/u. He was last seen Feb 19 with abdominal pain/nausea. He had an EGD performed on 2/22/2020. He was found to have gastritis--bx were negative for any H.Pylori or metaplasia. He also had a CT scan performed which demonstreated circumferential wall thickening in the pylorus c/w a hx of gastritis. He is here today for f/u.   He has not had any further nausea/emesis/or pain. He has been taking 2 dicyclomine per day and feels well.   2/19/2020 60 y/o male presenting for f/u. He was last seen in the clinic in Dec 2020. Since last saturday he has been having abdominal pain/spasm/bloating. He has not had any nausea/emesis. He had also lost about 11 lbs.  I placed him on dicyclomine PRN and he has been taking it.   12/15/2020 61 y/o male presenting for f/u. He has a prior hx of colon cancer and constipation. He has a bowel movement about every 2 days and does not take any medications currently. He was on benefiber and Miralax and stopped taking those. He does not have any rectal bleeding. He does have intermittent abdominal pain. He does take dicyclomine once daily; he is unsure if it helps his pain.   He has taken Align in the past and will go back on it. He was taking it for gas/bloating and it worked well.   9/4/2020 61 y/o male presenting for f/u. He does have a hx of colon cancer in 2014. He also had a tubular adenoma removed in 2019. He was taken for a colonoscopy recently (8/2020). It was normal. He is here today for f/u.   He does intermittently have constipation vs diarrhea. He cannot find any specific foods that cause diarrhea. He is taking Linzess; however he has stomach pain with it. He takes it PRN if he feels constipated. He has tried to take Metamucil every day. He sometimes doesn't like to take it since it causes feelings of incomplete evacuation.   He was taking Align in the past but could not tell any difference in taking it.  ----- [facility] 8/2020--lipoma, anastomosis.  [facility]  8/2019--sigmoid ta, hyperplastic polyp. 61-year-old male presenting for follow-up.  He was last seen in the clinic on March 9, 2021. He has been taking his miralax daily but it feels like he has incomplete evacaution.   Patient is without any abdominal complaints. No dysphagia, heartburn, regurgitation, unintentional weight loss, nausea, vomiting, hematemesis or melena. Bowels are moving regularly without blood per rectum. No complaints of bloating. No jaundice, icterus.  3/9/2021 60 y/o male presenting for f/u. He was last seen Feb 19 with abdominal pain/nausea. He had an EGD performed on 2/22/2020. He was found to have gastritis--bx were negative for any H.Pylori or metaplasia. He also had a CT scan performed which demonstreated circumferential wall thickening in the pylorus c/w a hx of gastritis. He is here today for f/u.   He has not had any further nausea/emesis/or pain. He has been taking 2 dicyclomine per day and feels well.   2/19/2020 60 y/o male presenting for f/u. He was last seen in the clinic in Dec 2020. Since last saturday he has been having abdominal pain/spasm/bloating. He has not had any nausea/emesis. He had also lost about 11 lbs.  I placed him on dicyclomine PRN and he has been taking it.   12/15/2020 61 y/o male presenting for f/u. He has a prior hx of colon cancer and constipation. He has a bowel movement about every 2 days and does not take any medications currently. He was on benefiber and Miralax and stopped taking those. He does not have any rectal bleeding. He does have intermittent abdominal pain. He does take dicyclomine once daily; he is unsure if it helps his pain.   He has taken Align in the past and will go back on it. He was taking it for gas/bloating and it worked well.   9/4/2020 61 y/o male presenting for f/u. He does have a hx of colon cancer in 2014. He also had a tubular adenoma removed in 2019. He was taken for a colonoscopy recently (8/2020). It was normal. He is here today for f/u.   He does intermittently have constipation vs diarrhea. He cannot find any specific foods that cause diarrhea. He is taking Linzess; however he has stomach pain with it. He takes it PRN if he feels constipated. He has tried to take Metamucil every day. He sometimes doesn't like to take it since it causes feelings of incomplete evacuation.   He was taking Align in the past but could not tell any difference in taking it.  ----- [facility] 8/2020--lipoma, anastomosis.  [facility]  8/2019--sigmoid ta, hyperplastic polyp. 60 y/o male presenting for f/u. He was last seen in the clinic in Dec 2020. Since last saturday he has been having abdominal pain/spasm/bloating. He has not had any nausea/emesis. He had also lost about 11 lbs.  I placed him on dicyclomine PRN and he has been taking it.   12/15/2020 61 y/o male presenting for f/u. He has a prior hx of colon cancer and constipation. He has a bowel movement about every 2 days and does not take any medications currently. He was on benefiber and Miralax and stopped taking those. He does not have any rectal bleeding. He does have intermittent abdominal pain. He does take dicyclomine once daily; he is unsure if it helps his pain.   He has taken Align in the past and will go back on it. He was taking it for gas/bloating and it worked well.   9/4/2020 61 y/o male presenting for f/u. He does have a hx of colon cancer in 2014. He also had a tubular adenoma removed in 2019. He was taken for a colonoscopy recently (8/2020). It was normal. He is here today for f/u.   He does intermittently have constipation vs diarrhea. He cannot find any specific foods that cause diarrhea. He is taking Linzess; however he has stomach pain with it. He takes it PRN if he feels constipated. He has tried to take Metamucil every day. He sometimes doesn't like to take it since it causes feelings of incomplete evacuation.   He was taking Align in the past but could not tell any difference in taking it.  ----- [facility] 8/2020--lipoma, anastomosis.  [facility]  8/2019--sigmoid ta, hyperplastic polyp.

## 2023-05-20 RX ORDER — HYOSCYAMINE SULFATE 0.125 MG
0.12 TABLET,DISINTEGRATING ORAL EVERY 4 HOURS PRN
COMMUNITY

## 2023-05-20 RX ORDER — DICYCLOMINE HCL 20 MG
20 TABLET ORAL EVERY 6 HOURS
COMMUNITY

## 2023-05-20 RX ORDER — TRAMADOL HYDROCHLORIDE 50 MG/1
50 TABLET ORAL EVERY 6 HOURS PRN
COMMUNITY

## 2023-05-20 RX ORDER — DICYCLOMINE HYDROCHLORIDE 10 MG/1
10 CAPSULE ORAL
COMMUNITY

## 2023-05-20 RX ORDER — OMEPRAZOLE 40 MG/1
40 CAPSULE, DELAYED RELEASE ORAL DAILY
COMMUNITY

## 2023-05-20 RX ORDER — PREDNISONE 20 MG/1
TABLET ORAL
COMMUNITY

## 2023-05-20 RX ORDER — GABAPENTIN 300 MG/1
300 CAPSULE ORAL 3 TIMES DAILY
COMMUNITY

## 2023-05-20 RX ORDER — MELOXICAM 15 MG/1
15 TABLET ORAL DAILY
COMMUNITY

## 2023-05-20 RX ORDER — RAMIPRIL 10 MG/1
10 CAPSULE ORAL DAILY
COMMUNITY

## 2023-05-20 RX ORDER — HYDROCORTISONE ACETATE 25 MG/1
25 SUPPOSITORY RECTAL EVERY 12 HOURS
COMMUNITY

## 2023-05-20 RX ORDER — ESOMEPRAZOLE MAGNESIUM 40 MG/1
CAPSULE, DELAYED RELEASE ORAL DAILY
COMMUNITY

## 2023-05-20 RX ORDER — TIZANIDINE 4 MG/1
4 TABLET ORAL 3 TIMES DAILY PRN
COMMUNITY

## 2023-05-20 RX ORDER — CYCLOBENZAPRINE HCL 10 MG
TABLET ORAL 3 TIMES DAILY PRN
COMMUNITY

## 2023-05-20 RX ORDER — ALPRAZOLAM 2 MG/1
TABLET ORAL
COMMUNITY

## 2023-05-20 RX ORDER — LIDOCAINE 50 MG/G
PATCH TOPICAL EVERY 24 HOURS
COMMUNITY

## 2023-05-20 RX ORDER — FAMOTIDINE 20 MG/1
20 TABLET, FILM COATED ORAL 2 TIMES DAILY
COMMUNITY

## 2023-05-20 RX ORDER — HYDROCHLOROTHIAZIDE 12.5 MG/1
12.5 CAPSULE, GELATIN COATED ORAL DAILY
COMMUNITY

## 2023-05-20 RX ORDER — FLUTICASONE PROPIONATE 50 MCG
2 SPRAY, SUSPENSION (ML) NASAL DAILY
COMMUNITY

## 2023-05-20 RX ORDER — CLOTRIMAZOLE 1 %
CREAM (GRAM) TOPICAL 2 TIMES DAILY
COMMUNITY

## 2023-07-14 ENCOUNTER — OFFICE VISIT (OUTPATIENT)
Dept: URBAN - METROPOLITAN AREA CLINIC 107 | Facility: CLINIC | Age: 63
End: 2023-07-14
Payer: MEDICARE

## 2023-07-14 ENCOUNTER — TELEPHONE ENCOUNTER (OUTPATIENT)
Dept: URBAN - METROPOLITAN AREA CLINIC 113 | Facility: CLINIC | Age: 63
End: 2023-07-14

## 2023-07-14 VITALS
RESPIRATION RATE: 18 BRPM | DIASTOLIC BLOOD PRESSURE: 70 MMHG | BODY MASS INDEX: 29.42 KG/M2 | HEIGHT: 73 IN | SYSTOLIC BLOOD PRESSURE: 127 MMHG | HEART RATE: 71 BPM | WEIGHT: 222 LBS | TEMPERATURE: 97.1 F

## 2023-07-14 DIAGNOSIS — R14.0 BLOATING: ICD-10-CM

## 2023-07-14 DIAGNOSIS — K58.1 IRRITABLE BOWEL SYNDROME WITH CONSTIPATION: ICD-10-CM

## 2023-07-14 DIAGNOSIS — K21.9 GASTROESOPHAGEAL REFLUX DISEASE, UNSPECIFIED WHETHER ESOPHAGITIS PRESENT: ICD-10-CM

## 2023-07-14 DIAGNOSIS — C18.9 COLON CANCER: ICD-10-CM

## 2023-07-14 DIAGNOSIS — K63.89 OTHER SPECIFIED DISEASES OF INTESTINE: ICD-10-CM

## 2023-07-14 DIAGNOSIS — R11.2 INTRACTABLE VOMITING WITH NAUSEA, UNSPECIFIED VOMITING TYPE: ICD-10-CM

## 2023-07-14 PROBLEM — 440630006: Status: ACTIVE | Noted: 2023-07-14

## 2023-07-14 PROCEDURE — 99214 OFFICE O/P EST MOD 30 MIN: CPT

## 2023-07-14 RX ORDER — FAMOTIDINE 20 MG/1
TAKE 1 TABLET DAILY TABLET ORAL
Refills: 0 | Status: ON HOLD | COMMUNITY
Start: 2019-11-07

## 2023-07-14 RX ORDER — PSYLLIUM SEED
AS DIRECTED PACKET (EA) ORAL
Status: ON HOLD | COMMUNITY

## 2023-07-14 RX ORDER — HYDROCHLOROTHIAZIDE 12.5 MG/1
TAKE 1 CAPSULE ONCE DAILY CAPSULE ORAL
Refills: 0 | Status: ACTIVE | COMMUNITY
Start: 2019-02-22

## 2023-07-14 RX ORDER — TRAMADOL HYDROCHLORIDE AND ACETAMINOPHEN 37.5; 325 MG/1; MG/1
TAKE 2 TABLETS EVERY 4-6 HOURS AS NEEDED FOR PAIN RELIEF TABLET, FILM COATED ORAL
Refills: 0 | Status: ACTIVE | COMMUNITY
Start: 2019-01-24

## 2023-07-14 RX ORDER — SALICYLIC ACID 3 G/100G
1 TABLET LOTION TOPICAL ONCE A DAY
Status: ACTIVE | COMMUNITY

## 2023-07-14 RX ORDER — SITAGLIPTIN 100 MG/1
1 TABLET TABLET, FILM COATED ORAL ONCE A DAY
Status: ACTIVE | COMMUNITY

## 2023-07-14 RX ORDER — PLECANATIDE 3 MG/1
1 TABLET TABLET ORAL ONCE A DAY
Qty: 90 | Refills: 3 | OUTPATIENT
Start: 2023-07-14 | End: 2024-07-08

## 2023-07-14 RX ORDER — ATORVASTATIN CALCIUM 80 MG/1
0.5 TABLET TABLET, FILM COATED ORAL
Status: ACTIVE | COMMUNITY

## 2023-07-14 RX ORDER — RAMIPRIL 10 MG/1
TAKE 1 CAPSULE ONCE DAILY CAPSULE ORAL
Refills: 0 | Status: ACTIVE | COMMUNITY
Start: 2019-02-13

## 2023-07-14 RX ORDER — ASPIRIN 325 MG
1 CAPSULE TABLET ORAL ONCE A DAY
Status: ACTIVE | COMMUNITY

## 2023-07-14 RX ORDER — OMEPRAZOLE 20 MG/1
TAKE 1 CAPSULE DAILY CAPSULE, DELAYED RELEASE ORAL ONCE A DAY
Refills: 0 | Status: ACTIVE | COMMUNITY
Start: 2019-12-03

## 2023-07-14 RX ORDER — LIDOCAINE HYDROCHLORIDE 30 MG/G
USE TOPICALLY AS DIRECTED CREAM TOPICAL
Refills: 0 | Status: ACTIVE | COMMUNITY

## 2023-07-14 RX ORDER — GABAPENTIN 300 MG/1
TAKE 1 CAPSULE CAPSULE ORAL
Refills: 0 | Status: ACTIVE | COMMUNITY
Start: 2020-01-02

## 2023-07-14 NOTE — HPI-OTHER HISTORIES
EGD 2/2021--gastritis.  [facility] 8/2020--lipoma, anastomosis.  [facility]  8/2019--sigmoid ta, hyperplastic polyp. CT 2/2021--R hemicolectomy/gastritis.

## 2023-07-14 NOTE — HPI-TODAY'S VISIT:
63 year old male presents for follow up. He was last seen on 4/25/2023. He had persistent bloating and constipation. He did not tolerate Linzess. He was tried on Trulance. No rx was sent.  He was unaware he was to take the Trulance every day. He took it on occasion and would caused "bubbly gut" but no bowel movement. Denies blood per rectum or unintentional weight loss. His bloating does improve with a BM.   4/25/2023 62 y/o male presenting for f/u. He was last seen in March 2022. He has been seen for bloating and constipation.  He has still had some constipation. He did not tolerate Linzess.   3/28/22 62-year-old male presenting for follow-up.  He was last seen in clinic in September 2021. His main complaint today is bloating. He does have chronic idiopathic constipation but takes smooth move tea every few days.  This does induce a bowel movement.  He denies any rectal bleeding or weight loss.  He denies any chronic abdominal pain.  9/15/21 61-year-old male presenting for follow-up.  He was last seen in the clinic on May 12, 2021. He is here today with a c/o bloating. He has taken dicyclomine but it makes him drowsy. He has also tried benefiber but it makes him have an upset stomach. He has about 2 bm every 2-3 days. His main complaint is bloating. He denies any belching/burping.   5/12/21 61-year-old male presenting for follow-up.  He was last seen in the clinic on March 9, 2021. He has been taking his miralax daily but it feels like he has incomplete evacaution.   Patient is without any abdominal complaints. No dysphagia, heartburn, regurgitation, unintentional weight loss, nausea, vomiting, hematemesis or melena. Bowels are moving regularly without blood per rectum. No complaints of bloating. No jaundice, icterus.  3/9/2021 62 y/o male presenting for f/u. He was last seen Feb 19 with abdominal pain/nausea. He had an EGD performed on 2/22/2020. He was found to have gastritis--bx were negative for any H.Pylori or metaplasia. He also had a CT scan performed which demonstreated circumferential wall thickening in the pylorus c/w a hx of gastritis. He is here today for f/u.   He has not had any further nausea/emesis/or pain. He has been taking 2 dicyclomine per day and feels well.   2/19/2021 62 y/o male presenting for f/u. He was last seen in the clinic in Dec 2020. Since last saturday he has been having abdominal pain/spasm/bloating. He has not had any nausea/emesis. He had also lost about 11 lbs.  I placed him on dicyclomine PRN and he has been taking it.   12/15/2020 59 y/o male presenting for f/u. He has a prior hx of colon cancer and constipation. He has a bowel movement about every 2 days and does not take any medications currently. He was on benefiber and Miralax and stopped taking those. He does not have any rectal bleeding. He does have intermittent abdominal pain. He does take dicyclomine once daily; he is unsure if it helps his pain.   He has taken Align in the past and will go back on it. He was taking it for gas/bloating and it worked well.   9/4/2020 59 y/o male presenting for f/u. He does have a hx of colon cancer in 2014. He also had a tubular adenoma removed in 2019. He was taken for a colonoscopy recently (8/2020). It was normal. He is here today for f/u.   He does intermittently have constipation vs diarrhea. He cannot find any specific foods that cause diarrhea. He is taking Linzess; however he has stomach pain with it. He takes it PRN if he feels constipated. He has tried to take Metamucil every day. He sometimes doesn't like to take it since it causes feelings of incomplete evacuation.   He was taking Align in the past but could not tell any difference in taking it.

## 2023-07-17 ENCOUNTER — TELEPHONE ENCOUNTER (OUTPATIENT)
Dept: URBAN - METROPOLITAN AREA CLINIC 6 | Facility: CLINIC | Age: 63
End: 2023-07-17

## 2023-07-17 RX ORDER — PSYLLIUM SEED
AS DIRECTED PACKET (EA) ORAL
Status: ON HOLD | COMMUNITY

## 2023-07-17 RX ORDER — SODIUM, POTASSIUM,MAG SULFATES 17.5-3.13G
177ML SOLUTION, RECONSTITUTED, ORAL ORAL
Qty: 1 | OUTPATIENT
Start: 2023-07-26 | End: 2023-07-28

## 2023-07-17 RX ORDER — PLECANATIDE 3 MG/1
1 TABLET TABLET ORAL ONCE A DAY
Qty: 90 | Refills: 3 | Status: ACTIVE | COMMUNITY
Start: 2023-07-14 | End: 2024-07-08

## 2023-07-17 RX ORDER — FAMOTIDINE 20 MG/1
TAKE 1 TABLET DAILY TABLET ORAL
Refills: 0 | Status: ON HOLD | COMMUNITY
Start: 2019-11-07

## 2023-07-17 RX ORDER — SITAGLIPTIN 100 MG/1
1 TABLET TABLET, FILM COATED ORAL ONCE A DAY
Status: ACTIVE | COMMUNITY

## 2023-07-17 RX ORDER — HYDROCHLOROTHIAZIDE 12.5 MG/1
TAKE 1 CAPSULE ONCE DAILY CAPSULE ORAL
Refills: 0 | Status: ACTIVE | COMMUNITY
Start: 2019-02-22

## 2023-07-17 RX ORDER — ASPIRIN 325 MG
1 CAPSULE TABLET ORAL ONCE A DAY
Status: ACTIVE | COMMUNITY

## 2023-07-17 RX ORDER — OMEPRAZOLE 20 MG/1
TAKE 1 CAPSULE DAILY CAPSULE, DELAYED RELEASE ORAL ONCE A DAY
Refills: 0 | Status: ACTIVE | COMMUNITY
Start: 2019-12-03

## 2023-07-17 RX ORDER — SALICYLIC ACID 3 G/100G
1 TABLET LOTION TOPICAL ONCE A DAY
Status: ACTIVE | COMMUNITY

## 2023-07-17 RX ORDER — RAMIPRIL 10 MG/1
TAKE 1 CAPSULE ONCE DAILY CAPSULE ORAL
Refills: 0 | Status: ACTIVE | COMMUNITY
Start: 2019-02-13

## 2023-07-17 RX ORDER — GABAPENTIN 300 MG/1
TAKE 1 CAPSULE CAPSULE ORAL
Refills: 0 | Status: ACTIVE | COMMUNITY
Start: 2020-01-02

## 2023-07-17 RX ORDER — TRAMADOL HYDROCHLORIDE AND ACETAMINOPHEN 37.5; 325 MG/1; MG/1
TAKE 2 TABLETS EVERY 4-6 HOURS AS NEEDED FOR PAIN RELIEF TABLET, FILM COATED ORAL
Refills: 0 | Status: ACTIVE | COMMUNITY
Start: 2019-01-24

## 2023-07-17 RX ORDER — LIDOCAINE HYDROCHLORIDE 30 MG/G
USE TOPICALLY AS DIRECTED CREAM TOPICAL
Refills: 0 | Status: ACTIVE | COMMUNITY

## 2023-07-17 RX ORDER — ATORVASTATIN CALCIUM 80 MG/1
0.5 TABLET TABLET, FILM COATED ORAL
Status: ACTIVE | COMMUNITY

## 2023-08-18 ENCOUNTER — TELEPHONE ENCOUNTER (OUTPATIENT)
Dept: URBAN - METROPOLITAN AREA SURGERY CENTER 25 | Facility: SURGERY CENTER | Age: 63
End: 2023-08-18

## 2023-09-12 ENCOUNTER — TELEPHONE ENCOUNTER (OUTPATIENT)
Dept: URBAN - METROPOLITAN AREA CLINIC 107 | Facility: CLINIC | Age: 63
End: 2023-09-12

## 2023-09-13 ENCOUNTER — TELEPHONE ENCOUNTER (OUTPATIENT)
Dept: URBAN - METROPOLITAN AREA CLINIC 107 | Facility: CLINIC | Age: 63
End: 2023-09-13

## 2023-09-14 ENCOUNTER — OUT OF OFFICE VISIT (OUTPATIENT)
Dept: URBAN - METROPOLITAN AREA SURGERY CENTER 25 | Facility: SURGERY CENTER | Age: 63
End: 2023-09-14
Payer: MEDICARE

## 2023-09-14 ENCOUNTER — CLAIMS CREATED FROM THE CLAIM WINDOW (OUTPATIENT)
Dept: URBAN - METROPOLITAN AREA CLINIC 4 | Facility: CLINIC | Age: 63
End: 2023-09-14
Payer: MEDICARE

## 2023-09-14 DIAGNOSIS — Z86.010 ADENOMAS PERSONAL HISTORY OF COLONIC POLYPS: ICD-10-CM

## 2023-09-14 DIAGNOSIS — D12.2 BENIGN NEOPLASM OF ASCENDING COLON: ICD-10-CM

## 2023-09-14 DIAGNOSIS — D12.2 ADENOMA OF ASCENDING COLON: ICD-10-CM

## 2023-09-14 DIAGNOSIS — Z12.11 COLON CANCER SCREENING (HIGH RISK): ICD-10-CM

## 2023-09-14 DIAGNOSIS — K57.30 COLON, DIVERTICULOSIS: ICD-10-CM

## 2023-09-14 DIAGNOSIS — Z85.038 H/O COLON CANCER, STAGE I: ICD-10-CM

## 2023-09-14 DIAGNOSIS — D12.2 ADENOMATOUS POLYP OF ASCENDING COLON: ICD-10-CM

## 2023-09-14 PROCEDURE — 00811 ANES LWR INTST NDSC NOS: CPT | Performed by: ANESTHESIOLOGY

## 2023-09-14 PROCEDURE — G8907 PT DOC NO EVENTS ON DISCHARG: HCPCS | Performed by: INTERNAL MEDICINE

## 2023-09-14 PROCEDURE — 88305 TISSUE EXAM BY PATHOLOGIST: CPT | Performed by: PATHOLOGY

## 2023-09-14 PROCEDURE — 45385 COLONOSCOPY W/LESION REMOVAL: CPT | Performed by: INTERNAL MEDICINE

## 2023-09-14 RX ORDER — HYDROCHLOROTHIAZIDE 12.5 MG/1
TAKE 1 CAPSULE ONCE DAILY CAPSULE ORAL
Refills: 0 | Status: ACTIVE | COMMUNITY
Start: 2019-02-22

## 2023-09-14 RX ORDER — ATORVASTATIN CALCIUM 80 MG/1
0.5 TABLET TABLET, FILM COATED ORAL
Status: ACTIVE | COMMUNITY

## 2023-09-14 RX ORDER — FAMOTIDINE 20 MG/1
TAKE 1 TABLET DAILY TABLET ORAL
Refills: 0 | Status: ON HOLD | COMMUNITY
Start: 2019-11-07

## 2023-09-14 RX ORDER — SALICYLIC ACID 3 G/100G
1 TABLET LOTION TOPICAL ONCE A DAY
Status: ACTIVE | COMMUNITY

## 2023-09-14 RX ORDER — PLECANATIDE 3 MG/1
1 TABLET TABLET ORAL ONCE A DAY
Qty: 90 | Refills: 3 | Status: ACTIVE | COMMUNITY
Start: 2023-07-14 | End: 2024-07-08

## 2023-09-14 RX ORDER — PSYLLIUM SEED
AS DIRECTED PACKET (EA) ORAL
Status: ON HOLD | COMMUNITY

## 2023-09-14 RX ORDER — TRAMADOL HYDROCHLORIDE AND ACETAMINOPHEN 37.5; 325 MG/1; MG/1
TAKE 2 TABLETS EVERY 4-6 HOURS AS NEEDED FOR PAIN RELIEF TABLET, FILM COATED ORAL
Refills: 0 | Status: ACTIVE | COMMUNITY
Start: 2019-01-24

## 2023-09-14 RX ORDER — LIDOCAINE HYDROCHLORIDE 30 MG/G
USE TOPICALLY AS DIRECTED CREAM TOPICAL
Refills: 0 | Status: ACTIVE | COMMUNITY

## 2023-09-14 RX ORDER — SITAGLIPTIN 100 MG/1
1 TABLET TABLET, FILM COATED ORAL ONCE A DAY
Status: ACTIVE | COMMUNITY

## 2023-09-14 RX ORDER — GABAPENTIN 300 MG/1
TAKE 1 CAPSULE CAPSULE ORAL
Refills: 0 | Status: ACTIVE | COMMUNITY
Start: 2020-01-02

## 2023-09-14 RX ORDER — RAMIPRIL 10 MG/1
TAKE 1 CAPSULE ONCE DAILY CAPSULE ORAL
Refills: 0 | Status: ACTIVE | COMMUNITY
Start: 2019-02-13

## 2023-09-14 RX ORDER — OMEPRAZOLE 20 MG/1
TAKE 1 CAPSULE DAILY CAPSULE, DELAYED RELEASE ORAL ONCE A DAY
Refills: 0 | Status: ACTIVE | COMMUNITY
Start: 2019-12-03

## 2023-09-14 RX ORDER — ASPIRIN 325 MG
1 CAPSULE TABLET ORAL ONCE A DAY
Status: ACTIVE | COMMUNITY

## 2023-09-27 ENCOUNTER — OFFICE VISIT (OUTPATIENT)
Dept: URBAN - METROPOLITAN AREA CLINIC 107 | Facility: CLINIC | Age: 63
End: 2023-09-27
Payer: MEDICARE

## 2023-09-27 VITALS
TEMPERATURE: 97.3 F | RESPIRATION RATE: 18 BRPM | DIASTOLIC BLOOD PRESSURE: 74 MMHG | HEIGHT: 73 IN | HEART RATE: 65 BPM | SYSTOLIC BLOOD PRESSURE: 131 MMHG | WEIGHT: 223 LBS | BODY MASS INDEX: 29.55 KG/M2

## 2023-09-27 DIAGNOSIS — K59.01 CONSTIPATION: ICD-10-CM

## 2023-09-27 DIAGNOSIS — C18.9 COLON CANCER: ICD-10-CM

## 2023-09-27 DIAGNOSIS — K58.1 IRRITABLE BOWEL SYNDROME WITH CONSTIPATION: ICD-10-CM

## 2023-09-27 DIAGNOSIS — R10.84 GENERALIZED ABDOMINAL PAIN: ICD-10-CM

## 2023-09-27 DIAGNOSIS — R14.0 BLOATING: ICD-10-CM

## 2023-09-27 DIAGNOSIS — K21.9 GASTROESOPHAGEAL REFLUX DISEASE, UNSPECIFIED WHETHER ESOPHAGITIS PRESENT: ICD-10-CM

## 2023-09-27 DIAGNOSIS — K58.0 IRRITABLE BOWEL SYNDROME WITH DIARRHEA: ICD-10-CM

## 2023-09-27 DIAGNOSIS — R11.2 INTRACTABLE VOMITING WITH NAUSEA, UNSPECIFIED VOMITING TYPE: ICD-10-CM

## 2023-09-27 DIAGNOSIS — K63.89 OTHER SPECIFIED DISEASES OF INTESTINE: ICD-10-CM

## 2023-09-27 PROCEDURE — 99214 OFFICE O/P EST MOD 30 MIN: CPT | Performed by: INTERNAL MEDICINE

## 2023-09-27 RX ORDER — OMEPRAZOLE 20 MG/1
TAKE 1 CAPSULE DAILY CAPSULE, DELAYED RELEASE ORAL ONCE A DAY
Refills: 0 | Status: ACTIVE | COMMUNITY
Start: 2019-12-03

## 2023-09-27 RX ORDER — PLECANATIDE 3 MG/1
1 TABLET TABLET ORAL ONCE A DAY
Qty: 90 | Refills: 3 | Status: ACTIVE | COMMUNITY
Start: 2023-07-14 | End: 2024-07-08

## 2023-09-27 RX ORDER — GABAPENTIN 300 MG/1
TAKE 1 CAPSULE CAPSULE ORAL
Refills: 0 | Status: ACTIVE | COMMUNITY
Start: 2020-01-02

## 2023-09-27 RX ORDER — FAMOTIDINE 20 MG/1
TAKE 1 TABLET DAILY TABLET ORAL
Refills: 0 | Status: ON HOLD | COMMUNITY
Start: 2019-11-07

## 2023-09-27 RX ORDER — ASPIRIN 325 MG
1 CAPSULE TABLET ORAL ONCE A DAY
Status: ACTIVE | COMMUNITY

## 2023-09-27 RX ORDER — RAMIPRIL 10 MG/1
TAKE 1 CAPSULE ONCE DAILY CAPSULE ORAL
Refills: 0 | Status: ACTIVE | COMMUNITY
Start: 2019-02-13

## 2023-09-27 RX ORDER — SALICYLIC ACID 3 G/100G
1 TABLET LOTION TOPICAL ONCE A DAY
Status: ACTIVE | COMMUNITY

## 2023-09-27 RX ORDER — SITAGLIPTIN 100 MG/1
1 TABLET TABLET, FILM COATED ORAL ONCE A DAY
Status: ACTIVE | COMMUNITY

## 2023-09-27 RX ORDER — HYDROCHLOROTHIAZIDE 12.5 MG/1
TAKE 1 CAPSULE ONCE DAILY CAPSULE ORAL
Refills: 0 | Status: ACTIVE | COMMUNITY
Start: 2019-02-22

## 2023-09-27 RX ORDER — TRAMADOL HYDROCHLORIDE AND ACETAMINOPHEN 37.5; 325 MG/1; MG/1
TAKE 2 TABLETS EVERY 4-6 HOURS AS NEEDED FOR PAIN RELIEF TABLET, FILM COATED ORAL
Refills: 0 | Status: ACTIVE | COMMUNITY
Start: 2019-01-24

## 2023-09-27 RX ORDER — PSYLLIUM SEED
AS DIRECTED PACKET (EA) ORAL
Status: ON HOLD | COMMUNITY

## 2023-09-27 RX ORDER — ATORVASTATIN CALCIUM 80 MG/1
0.5 TABLET TABLET, FILM COATED ORAL
Status: ACTIVE | COMMUNITY

## 2023-09-27 RX ORDER — PANTOPRAZOLE SODIUM 20 MG/1
1 TABLET TABLET, DELAYED RELEASE ORAL ONCE A DAY
Qty: 90 TABLET | Refills: 3 | OUTPATIENT
Start: 2023-09-27

## 2023-09-27 RX ORDER — LIDOCAINE HYDROCHLORIDE 30 MG/G
USE TOPICALLY AS DIRECTED CREAM TOPICAL
Refills: 0 | Status: ACTIVE | COMMUNITY

## 2023-09-27 NOTE — PHYSICAL EXAM CHEST:
chest wall non-tender, breathing is unlabored without accessory muscle use, normal breath sounds
Uncooperative

## 2023-09-27 NOTE — HPI-TODAY'S VISIT:
64 y/o male presenting for f/u. He was last seen in July 2023. He has a hx of constipation and colon cancer. His last CSC was September 14, 2023 and he was found to have a small colon TA. He does have bloating/pain. He has lost 12 lbs with diet/weight loss.   He does still have bloating/constipation. He is on Trulance PRN. He thinks omeprazole is not working well.   7/14/23 63 year old male presents for follow up. He was last seen on 4/25/2023. He had persistent bloating and constipation. He did not tolerate Linzess. He was tried on Trulance. No rx was sent.  He was unaware he was to take the Trulance every day. He took it on occasion and would caused "bubbly gut" but no bowel movement. Denies blood per rectum or unintentional weight loss. His bloating does improve with a BM.   4/25/2023 64 y/o male presenting for f/u. He was last seen in March 2022. He has been seen for bloating and constipation.  He has still had some constipation. He did not tolerate Linzess.   3/28/22 62-year-old male presenting for follow-up.  He was last seen in clinic in September 2021. His main complaint today is bloating. He does have chronic idiopathic constipation but takes smooth move tea every few days.  This does induce a bowel movement.  He denies any rectal bleeding or weight loss.  He denies any chronic abdominal pain.  9/15/21 61-year-old male presenting for follow-up.  He was last seen in the clinic on May 12, 2021. He is here today with a c/o bloating. He has taken dicyclomine but it makes him drowsy. He has also tried benefiber but it makes him have an upset stomach. He has about 2 bm every 2-3 days. His main complaint is bloating. He denies any belching/burping.   5/12/21 61-year-old male presenting for follow-up.  He was last seen in the clinic on March 9, 2021. He has been taking his miralax daily but it feels like he has incomplete evacaution.   Patient is without any abdominal complaints. No dysphagia, heartburn, regurgitation, unintentional weight loss, nausea, vomiting, hematemesis or melena. Bowels are moving regularly without blood per rectum. No complaints of bloating. No jaundice, icterus.  3/9/2021 60 y/o male presenting for f/u. He was last seen Feb 19 with abdominal pain/nausea. He had an EGD performed on 2/22/2020. He was found to have gastritis--bx were negative for any H.Pylori or metaplasia. He also had a CT scan performed which demonstreated circumferential wall thickening in the pylorus c/w a hx of gastritis. He is here today for f/u.   He has not had any further nausea/emesis/or pain. He has been taking 2 dicyclomine per day and feels well.   2/19/2021 60 y/o male presenting for f/u. He was last seen in the clinic in Dec 2020. Since last saturday he has been having abdominal pain/spasm/bloating. He has not had any nausea/emesis. He had also lost about 11 lbs.  I placed him on dicyclomine PRN and he has been taking it.   12/15/2020 59 y/o male presenting for f/u. He has a prior hx of colon cancer and constipation. He has a bowel movement about every 2 days and does not take any medications currently. He was on benefiber and Miralax and stopped taking those. He does not have any rectal bleeding. He does have intermittent abdominal pain. He does take dicyclomine once daily; he is unsure if it helps his pain.   He has taken Align in the past and will go back on it. He was taking it for gas/bloating and it worked well.   9/4/2020 59 y/o male presenting for f/u. He does have a hx of colon cancer in 2014. He also had a tubular adenoma removed in 2019. He was taken for a colonoscopy recently (8/2020). It was normal. He is here today for f/u.   He does intermittently have constipation vs diarrhea. He cannot find any specific foods that cause diarrhea. He is taking Linzess; however he has stomach pain with it. He takes it PRN if he feels constipated. He has tried to take Metamucil every day. He sometimes doesn't like to take it since it causes feelings of incomplete evacuation.   He was taking Align in the past but could not tell any difference in taking it.

## 2023-10-03 ENCOUNTER — OFFICE VISIT (OUTPATIENT)
Dept: URBAN - METROPOLITAN AREA CLINIC 113 | Facility: CLINIC | Age: 63
End: 2023-10-03

## 2024-02-21 ENCOUNTER — OV EP (OUTPATIENT)
Dept: URBAN - METROPOLITAN AREA CLINIC 113 | Facility: CLINIC | Age: 64
End: 2024-02-21
Payer: MEDICARE

## 2024-02-21 VITALS
DIASTOLIC BLOOD PRESSURE: 75 MMHG | BODY MASS INDEX: 31.94 KG/M2 | HEART RATE: 66 BPM | HEIGHT: 73 IN | TEMPERATURE: 97.8 F | SYSTOLIC BLOOD PRESSURE: 135 MMHG | WEIGHT: 241 LBS | RESPIRATION RATE: 18 BRPM

## 2024-02-21 DIAGNOSIS — R14.0 BLOATING: ICD-10-CM

## 2024-02-21 DIAGNOSIS — R11.2 INTRACTABLE VOMITING WITH NAUSEA, UNSPECIFIED VOMITING TYPE: ICD-10-CM

## 2024-02-21 DIAGNOSIS — K58.1 IRRITABLE BOWEL SYNDROME WITH CONSTIPATION: ICD-10-CM

## 2024-02-21 DIAGNOSIS — R10.13 EPIGASTRIC BURNING SENSATION: ICD-10-CM

## 2024-02-21 DIAGNOSIS — K21.9 GASTROESOPHAGEAL REFLUX DISEASE, UNSPECIFIED WHETHER ESOPHAGITIS PRESENT: ICD-10-CM

## 2024-02-21 DIAGNOSIS — C18.9 COLON CANCER: ICD-10-CM

## 2024-02-21 PROCEDURE — 99214 OFFICE O/P EST MOD 30 MIN: CPT

## 2024-02-21 RX ORDER — FAMOTIDINE 20 MG/1
TAKE 1 TABLET DAILY TABLET ORAL
Refills: 0 | Status: ON HOLD | COMMUNITY
Start: 2019-11-07

## 2024-02-21 RX ORDER — OMEPRAZOLE 20 MG/1
TAKE 1 CAPSULE DAILY CAPSULE, DELAYED RELEASE ORAL ONCE A DAY
Refills: 0 | Status: ON HOLD | COMMUNITY
Start: 2019-12-03

## 2024-02-21 RX ORDER — RABEPRAZOLE SODIUM 20 MG/1
1 TABLET TABLET, DELAYED RELEASE ORAL ONCE A DAY
Qty: 90 TABLET | Refills: 0 | Status: ON HOLD | COMMUNITY
Start: 2024-02-12

## 2024-02-21 RX ORDER — ASPIRIN 325 MG
1 CAPSULE TABLET ORAL ONCE A DAY
Status: ACTIVE | COMMUNITY

## 2024-02-21 RX ORDER — SALICYLIC ACID 3 G/100G
1 TABLET LOTION TOPICAL ONCE A DAY
Status: ACTIVE | COMMUNITY

## 2024-02-21 RX ORDER — HYDROCHLOROTHIAZIDE 12.5 MG/1
TAKE 1 CAPSULE ONCE DAILY CAPSULE ORAL
Refills: 0 | Status: ACTIVE | COMMUNITY
Start: 2019-02-22

## 2024-02-21 RX ORDER — ATORVASTATIN CALCIUM 80 MG/1
0.5 TABLET TABLET, FILM COATED ORAL
Status: ACTIVE | COMMUNITY

## 2024-02-21 RX ORDER — GABAPENTIN 300 MG/1
TAKE 1 CAPSULE CAPSULE ORAL
Refills: 0 | Status: ACTIVE | COMMUNITY
Start: 2020-01-02

## 2024-02-21 RX ORDER — SUCRALFATE 1 G/1
1 TABLET ON AN EMPTY STOMACH TABLET ORAL TWICE A DAY
Qty: 60 | Refills: 3 | OUTPATIENT
Start: 2024-02-21 | End: 2024-06-20

## 2024-02-21 RX ORDER — RAMIPRIL 10 MG/1
TAKE 1 CAPSULE ONCE DAILY CAPSULE ORAL
Refills: 0 | Status: ACTIVE | COMMUNITY
Start: 2019-02-13

## 2024-02-21 RX ORDER — PLECANATIDE 3 MG/1
1 TABLET TABLET ORAL ONCE A DAY
Qty: 90 | Refills: 3 | Status: ACTIVE | COMMUNITY
Start: 2023-07-14 | End: 2024-07-08

## 2024-02-21 RX ORDER — LIDOCAINE HYDROCHLORIDE 30 MG/G
USE TOPICALLY AS DIRECTED CREAM TOPICAL
Refills: 0 | Status: ACTIVE | COMMUNITY

## 2024-02-21 RX ORDER — PANTOPRAZOLE SODIUM 20 MG/1
1 TABLET TABLET, DELAYED RELEASE ORAL ONCE A DAY
Qty: 90 TABLET | Refills: 3 | Status: ACTIVE | COMMUNITY
Start: 2023-09-27

## 2024-02-21 RX ORDER — SITAGLIPTIN 100 MG/1
1 TABLET TABLET, FILM COATED ORAL ONCE A DAY
Status: ACTIVE | COMMUNITY

## 2024-02-21 RX ORDER — PSYLLIUM SEED
AS DIRECTED PACKET (EA) ORAL
Status: ON HOLD | COMMUNITY

## 2024-02-21 RX ORDER — TRAMADOL HYDROCHLORIDE AND ACETAMINOPHEN 37.5; 325 MG/1; MG/1
TAKE 2 TABLETS EVERY 4-6 HOURS AS NEEDED FOR PAIN RELIEF TABLET, FILM COATED ORAL
Refills: 0 | Status: ACTIVE | COMMUNITY
Start: 2019-01-24

## 2024-02-21 NOTE — HPI-TODAY'S VISIT:
64-year-old male presents for follow-up.  He was last seen on 9/27/2023.  His PPI was switched to pantoprazole 20 mg daily.  He was to continue Trulance as needed for his constipation.  He called the office in early February complaining of stomach burning and requesting an earlier appointment.  He did not understand why he was taking pantoprazole and was displeased.  We did try to contact him.  He was to try Aciphex instead and prescription was sent to his pharmacy.  For the past 3 weeks has he has worsening burning in his epigastrium and nausea. He states Pantoprazole was helping at first but lost effect. he denies nay new medications or changes in medications prior to symptom onset. He is not taking his Trulance regularly. He has a BM every 3-4 days.   9/27/2023:  64 y/o male presenting for f/u. He was last seen in July 2023. He has a hx of constipation and colon cancer. His last CSC was September 14, 2023 and he was found to have a small colon TA. He does have bloating/pain. He has lost 12 lbs with diet/weight loss.   He does still have bloating/constipation. He is on Trulance PRN. He thinks omeprazole is not working well.   7/14/23 63 year old male presents for follow up. He was last seen on 4/25/2023. He had persistent bloating and constipation. He did not tolerate Linzess. He was tried on Trulance. No rx was sent.  He was unaware he was to take the Trulance every day. He took it on occasion and would caused "bubbly gut" but no bowel movement. Denies blood per rectum or unintentional weight loss. His bloating does improve with a BM.   4/25/2023 64 y/o male presenting for f/u. He was last seen in March 2022. He has been seen for bloating and constipation.  He has still had some constipation. He did not tolerate Linzess.   3/28/22 62-year-old male presenting for follow-up.  He was last seen in clinic in September 2021. His main complaint today is bloating. He does have chronic idiopathic constipation but takes smooth move tea every few days.  This does induce a bowel movement.  He denies any rectal bleeding or weight loss.  He denies any chronic abdominal pain.  9/15/21 61-year-old male presenting for follow-up.  He was last seen in the clinic on May 12, 2021. He is here today with a c/o bloating. He has taken dicyclomine but it makes him drowsy. He has also tried benefiber but it makes him have an upset stomach. He has about 2 bm every 2-3 days. His main complaint is bloating. He denies any belching/burping.   5/12/21 61-year-old male presenting for follow-up.  He was last seen in the clinic on March 9, 2021. He has been taking his miralax daily but it feels like he has incomplete evacaution.   Patient is without any abdominal complaints. No dysphagia, heartburn, regurgitation, unintentional weight loss, nausea, vomiting, hematemesis or melena. Bowels are moving regularly without blood per rectum. No complaints of bloating. No jaundice, icterus.  3/9/2021 62 y/o male presenting for f/u. He was last seen Feb 19 with abdominal pain/nausea. He had an EGD performed on 2/22/2020. He was found to have gastritis--bx were negative for any H.Pylori or metaplasia. He also had a CT scan performed which demonstreated circumferential wall thickening in the pylorus c/w a hx of gastritis. He is here today for f/u.   He has not had any further nausea/emesis/or pain. He has been taking 2 dicyclomine per day and feels well.   2/19/2021 62 y/o male presenting for f/u. He was last seen in the clinic in Dec 2020. Since last saturday he has been having abdominal pain/spasm/bloating. He has not had any nausea/emesis. He had also lost about 11 lbs.  I placed him on dicyclomine PRN and he has been taking it.   12/15/2020 59 y/o male presenting for f/u. He has a prior hx of colon cancer and constipation. He has a bowel movement about every 2 days and does not take any medications currently. He was on benefiber and Miralax and stopped taking those. He does not have any rectal bleeding. He does have intermittent abdominal pain. He does take dicyclomine once daily; he is unsure if it helps his pain.   He has taken Align in the past and will go back on it. He was taking it for gas/bloating and it worked well.   9/4/2020 59 y/o male presenting for f/u. He does have a hx of colon cancer in 2014. He also had a tubular adenoma removed in 2019. He was taken for a colonoscopy recently (8/2020). It was normal. He is here today for f/u.   He does intermittently have constipation vs diarrhea. He cannot find any specific foods that cause diarrhea. He is taking Linzess; however he has stomach pain with it. He takes it PRN if he feels constipated. He has tried to take Metamucil every day. He sometimes doesn't like to take it since it causes feelings of incomplete evacuation.   He was taking Align in the past but could not tell any difference in taking it.

## 2024-02-28 ENCOUNTER — OV EP (OUTPATIENT)
Dept: URBAN - METROPOLITAN AREA CLINIC 107 | Facility: CLINIC | Age: 64
End: 2024-02-28

## 2024-03-04 ENCOUNTER — EGD (OUTPATIENT)
Dept: URBAN - METROPOLITAN AREA SURGERY CENTER 25 | Facility: SURGERY CENTER | Age: 64
End: 2024-03-04
Payer: MEDICARE

## 2024-03-04 ENCOUNTER — LAB (OUTPATIENT)
Dept: URBAN - METROPOLITAN AREA CLINIC 4 | Facility: CLINIC | Age: 64
End: 2024-03-04
Payer: MEDICARE

## 2024-03-04 DIAGNOSIS — K29.70 GASTRITIS, UNSPECIFIED, WITHOUT BLEEDING: ICD-10-CM

## 2024-03-04 DIAGNOSIS — K29.70 CHRONIC ACITVE GASTRITIS (H.PYLORI NEGATIVE): ICD-10-CM

## 2024-03-04 DIAGNOSIS — R10.13 ABDOMINAL DISCOMFORT, EPIGASTRIC: ICD-10-CM

## 2024-03-04 PROCEDURE — 43239 EGD BIOPSY SINGLE/MULTIPLE: CPT | Performed by: INTERNAL MEDICINE

## 2024-03-04 PROCEDURE — 88305 TISSUE EXAM BY PATHOLOGIST: CPT | Performed by: PATHOLOGY

## 2024-03-04 PROCEDURE — 88312 SPECIAL STAINS GROUP 1: CPT | Performed by: PATHOLOGY

## 2024-03-04 RX ORDER — RAMIPRIL 10 MG/1
TAKE 1 CAPSULE ONCE DAILY CAPSULE ORAL
Refills: 0 | Status: ACTIVE | COMMUNITY
Start: 2019-02-13

## 2024-03-04 RX ORDER — ATORVASTATIN CALCIUM 80 MG/1
0.5 TABLET TABLET, FILM COATED ORAL
Status: ACTIVE | COMMUNITY

## 2024-03-04 RX ORDER — SUCRALFATE 1 G/1
1 TABLET ON AN EMPTY STOMACH TABLET ORAL TWICE A DAY
Qty: 60 | Refills: 3 | Status: ACTIVE | COMMUNITY
Start: 2024-02-21 | End: 2024-06-20

## 2024-03-04 RX ORDER — SITAGLIPTIN 100 MG/1
1 TABLET TABLET, FILM COATED ORAL ONCE A DAY
Status: ACTIVE | COMMUNITY

## 2024-03-04 RX ORDER — ASPIRIN 325 MG
1 CAPSULE TABLET ORAL ONCE A DAY
Status: ACTIVE | COMMUNITY

## 2024-03-04 RX ORDER — LIDOCAINE HYDROCHLORIDE 30 MG/G
USE TOPICALLY AS DIRECTED CREAM TOPICAL
Refills: 0 | Status: ACTIVE | COMMUNITY

## 2024-03-04 RX ORDER — TRAMADOL HYDROCHLORIDE AND ACETAMINOPHEN 37.5; 325 MG/1; MG/1
TAKE 2 TABLETS EVERY 4-6 HOURS AS NEEDED FOR PAIN RELIEF TABLET, FILM COATED ORAL
Refills: 0 | Status: ACTIVE | COMMUNITY
Start: 2019-01-24

## 2024-03-04 RX ORDER — HYDROCHLOROTHIAZIDE 12.5 MG/1
TAKE 1 CAPSULE ONCE DAILY CAPSULE ORAL
Refills: 0 | Status: ACTIVE | COMMUNITY
Start: 2019-02-22

## 2024-03-04 RX ORDER — SALICYLIC ACID 3 G/100G
1 TABLET LOTION TOPICAL ONCE A DAY
Status: ACTIVE | COMMUNITY

## 2024-03-04 RX ORDER — PANTOPRAZOLE SODIUM 20 MG/1
1 TABLET TABLET, DELAYED RELEASE ORAL ONCE A DAY
Qty: 90 TABLET | Refills: 3 | Status: ACTIVE | COMMUNITY
Start: 2023-09-27

## 2024-03-04 RX ORDER — RABEPRAZOLE SODIUM 20 MG/1
1 TABLET TABLET, DELAYED RELEASE ORAL ONCE A DAY
Qty: 90 TABLET | Refills: 0 | Status: ON HOLD | COMMUNITY
Start: 2024-02-12

## 2024-03-04 RX ORDER — PLECANATIDE 3 MG/1
1 TABLET TABLET ORAL ONCE A DAY
Qty: 90 | Refills: 3 | Status: ACTIVE | COMMUNITY
Start: 2023-07-14 | End: 2024-07-08

## 2024-03-04 RX ORDER — GABAPENTIN 300 MG/1
TAKE 1 CAPSULE CAPSULE ORAL
Refills: 0 | Status: ACTIVE | COMMUNITY
Start: 2020-01-02

## 2024-03-04 RX ORDER — FAMOTIDINE 20 MG/1
TAKE 1 TABLET DAILY TABLET ORAL
Refills: 0 | Status: ON HOLD | COMMUNITY
Start: 2019-11-07

## 2024-03-04 RX ORDER — PSYLLIUM SEED
AS DIRECTED PACKET (EA) ORAL
Status: ON HOLD | COMMUNITY

## 2024-03-04 RX ORDER — OMEPRAZOLE 20 MG/1
TAKE 1 CAPSULE DAILY CAPSULE, DELAYED RELEASE ORAL ONCE A DAY
Refills: 0 | Status: ON HOLD | COMMUNITY
Start: 2019-12-03

## 2024-04-10 ENCOUNTER — OV EP (OUTPATIENT)
Dept: URBAN - METROPOLITAN AREA CLINIC 113 | Facility: CLINIC | Age: 64
End: 2024-04-10
Payer: MEDICARE

## 2024-04-10 VITALS
HEIGHT: 73 IN | WEIGHT: 237.4 LBS | RESPIRATION RATE: 18 BRPM | DIASTOLIC BLOOD PRESSURE: 70 MMHG | HEART RATE: 77 BPM | SYSTOLIC BLOOD PRESSURE: 115 MMHG | BODY MASS INDEX: 31.46 KG/M2 | TEMPERATURE: 98.1 F

## 2024-04-10 DIAGNOSIS — R10.13 EPIGASTRIC BURNING SENSATION: ICD-10-CM

## 2024-04-10 DIAGNOSIS — K58.1 IRRITABLE BOWEL SYNDROME WITH CONSTIPATION: ICD-10-CM

## 2024-04-10 DIAGNOSIS — C18.9 COLON CANCER: ICD-10-CM

## 2024-04-10 DIAGNOSIS — R14.0 BLOATING: ICD-10-CM

## 2024-04-10 DIAGNOSIS — R11.2 INTRACTABLE VOMITING WITH NAUSEA, UNSPECIFIED VOMITING TYPE: ICD-10-CM

## 2024-04-10 DIAGNOSIS — K21.9 GASTROESOPHAGEAL REFLUX DISEASE, UNSPECIFIED WHETHER ESOPHAGITIS PRESENT: ICD-10-CM

## 2024-04-10 PROCEDURE — 99213 OFFICE O/P EST LOW 20 MIN: CPT

## 2024-04-10 RX ORDER — ASPIRIN 325 MG
1 CAPSULE TABLET ORAL ONCE A DAY
Status: ACTIVE | COMMUNITY

## 2024-04-10 RX ORDER — PLECANATIDE 3 MG/1
1 TABLET TABLET ORAL ONCE A DAY
Qty: 90 | Refills: 0
Start: 2023-07-14 | End: 2024-07-09

## 2024-04-10 RX ORDER — DICYCLOMINE HYDROCHLORIDE 10 MG/1
2 CAPSULES CAPSULE ORAL THREE TIMES A DAY
Qty: 180 | Status: ACTIVE | COMMUNITY
Start: 2024-03-11 | End: 2024-04-10

## 2024-04-10 RX ORDER — PANTOPRAZOLE SODIUM 20 MG/1
1 TABLET TABLET, DELAYED RELEASE ORAL ONCE A DAY
Qty: 90 TABLET | Refills: 3 | Status: ACTIVE | COMMUNITY
Start: 2023-09-27

## 2024-04-10 RX ORDER — TRAMADOL HYDROCHLORIDE AND ACETAMINOPHEN 37.5; 325 MG/1; MG/1
TAKE 2 TABLETS EVERY 4-6 HOURS AS NEEDED FOR PAIN RELIEF TABLET, FILM COATED ORAL
Refills: 0 | Status: ACTIVE | COMMUNITY
Start: 2019-01-24

## 2024-04-10 RX ORDER — RAMIPRIL 10 MG/1
TAKE 1 CAPSULE ONCE DAILY CAPSULE ORAL
Refills: 0 | Status: ACTIVE | COMMUNITY
Start: 2019-02-13

## 2024-04-10 RX ORDER — RABEPRAZOLE SODIUM 20 MG/1
1 TABLET TABLET, DELAYED RELEASE ORAL ONCE A DAY
OUTPATIENT
Start: 2024-02-12

## 2024-04-10 RX ORDER — SUCRALFATE 1 G/1
1 TABLET ON AN EMPTY STOMACH TABLET ORAL TWICE A DAY
Qty: 60 | Refills: 3 | Status: ACTIVE | COMMUNITY
Start: 2024-02-21 | End: 2024-06-20

## 2024-04-10 RX ORDER — RABEPRAZOLE SODIUM 20 MG/1
1 TABLET TABLET, DELAYED RELEASE ORAL ONCE A DAY
Qty: 90 TABLET | Refills: 0 | Status: ON HOLD | COMMUNITY
Start: 2024-02-12

## 2024-04-10 RX ORDER — SUCRALFATE 1 G/1
1 TABLET ON AN EMPTY STOMACH TABLET ORAL TWICE A DAY
Qty: 60 | Refills: 3 | OUTPATIENT

## 2024-04-10 RX ORDER — PLECANATIDE 3 MG/1
1 TABLET TABLET ORAL ONCE A DAY
Qty: 90 | Refills: 3 | Status: ACTIVE | COMMUNITY
Start: 2023-07-14 | End: 2024-07-08

## 2024-04-10 RX ORDER — LIDOCAINE HYDROCHLORIDE 30 MG/G
USE TOPICALLY AS DIRECTED CREAM TOPICAL
Refills: 0 | Status: ACTIVE | COMMUNITY

## 2024-04-10 RX ORDER — SALICYLIC ACID 3 G/100G
1 TABLET LOTION TOPICAL ONCE A DAY
Status: ACTIVE | COMMUNITY

## 2024-04-10 RX ORDER — PSYLLIUM SEED
AS DIRECTED PACKET (EA) ORAL
Status: ON HOLD | COMMUNITY

## 2024-04-10 RX ORDER — SITAGLIPTIN 100 MG/1
1 TABLET TABLET, FILM COATED ORAL ONCE A DAY
Status: ACTIVE | COMMUNITY

## 2024-04-10 RX ORDER — HYDROCHLOROTHIAZIDE 12.5 MG/1
TAKE 1 CAPSULE ONCE DAILY CAPSULE ORAL
Refills: 0 | Status: ACTIVE | COMMUNITY
Start: 2019-02-22

## 2024-04-10 RX ORDER — FAMOTIDINE 20 MG/1
TAKE 1 TABLET DAILY TABLET ORAL
Refills: 0 | Status: ON HOLD | COMMUNITY
Start: 2019-11-07

## 2024-04-10 RX ORDER — OMEPRAZOLE 20 MG/1
TAKE 1 CAPSULE DAILY CAPSULE, DELAYED RELEASE ORAL ONCE A DAY
Refills: 0 | Status: ON HOLD | COMMUNITY
Start: 2019-12-03

## 2024-04-10 RX ORDER — GABAPENTIN 300 MG/1
TAKE 1 CAPSULE CAPSULE ORAL
Refills: 0 | Status: ACTIVE | COMMUNITY
Start: 2020-01-02

## 2024-04-10 RX ORDER — ATORVASTATIN CALCIUM 80 MG/1
0.5 TABLET TABLET, FILM COATED ORAL
Status: ACTIVE | COMMUNITY

## 2024-04-10 NOTE — HPI-TODAY'S VISIT:
64-year-old male presents for follow-up.  He was last seen on 2/21/2024.  He was recommended to start rabeprazole as previously directed and try Carafate twice daily.  He is planned for EGD as well.  If normal and symptoms persisted we would consider CT imaging.  Regarding his constipation, this was suboptimally controlled.  He was to increase Trulance use to daily. EGD 3/4/2024: Performed without difficulty.  Normal esophagus and duodenum.  Gastritis, biopsied.  Pathology revealed mild chemical/reactive gastropathy, negative for H. pyloriTest metaplasia. He presents today with his wife.  He has had marked improvement in his symptoms with rabeprazole once a day and Carafate as needed.  His constipation has been controlled with only taking Trulance once a week.  He is going daily.  He otherwise has no complaints at this time.  2/21/2024: 64-year-old male presents for follow-up.  He was last seen on 9/27/2023.  His PPI was switched to pantoprazole 20 mg daily.  He was to continue Trulance as needed for his constipation.  He called the office in early February complaining of stomach burning and requesting an earlier appointment.  He did not understand why he was taking pantoprazole and was displeased.  We did try to contact him.  He was to try Aciphex instead and prescription was sent to his pharmacy.  For the past 3 weeks has he has worsening burning in his epigastrium and nausea. He states Pantoprazole was helping at first but lost effect. he denies nay new medications or changes in medications prior to symptom onset. He is not taking his Trulance regularly. He has a BM every 3-4 days.   9/27/2023:  62 y/o male presenting for f/u. He was last seen in July 2023. He has a hx of constipation and colon cancer. His last CSC was September 14, 2023 and he was found to have a small colon TA. He does have bloating/pain. He has lost 12 lbs with diet/weight loss.   He does still have bloating/constipation. He is on Trulance PRN. He thinks omeprazole is not working well.   7/14/23 63 year old male presents for follow up. He was last seen on 4/25/2023. He had persistent bloating and constipation. He did not tolerate Linzess. He was tried on Trulance. No rx was sent.  He was unaware he was to take the Trulance every day. He took it on occasion and would caused "bubbly gut" but no bowel movement. Denies blood per rectum or unintentional weight loss. His bloating does improve with a BM.   4/25/2023 62 y/o male presenting for f/u. He was last seen in March 2022. He has been seen for bloating and constipation.  He has still had some constipation. He did not tolerate Linzess.   3/28/22 62-year-old male presenting for follow-up.  He was last seen in clinic in September 2021. His main complaint today is bloating. He does have chronic idiopathic constipation but takes smooth move tea every few days.  This does induce a bowel movement.  He denies any rectal bleeding or weight loss.  He denies any chronic abdominal pain.  9/15/21 61-year-old male presenting for follow-up.  He was last seen in the clinic on May 12, 2021. He is here today with a c/o bloating. He has taken dicyclomine but it makes him drowsy. He has also tried benefiber but it makes him have an upset stomach. He has about 2 bm every 2-3 days. His main complaint is bloating. He denies any belching/burping.   5/12/21 61-year-old male presenting for follow-up.  He was last seen in the clinic on March 9, 2021. He has been taking his miralax daily but it feels like he has incomplete evacaution.   Patient is without any abdominal complaints. No dysphagia, heartburn, regurgitation, unintentional weight loss, nausea, vomiting, hematemesis or melena. Bowels are moving regularly without blood per rectum. No complaints of bloating. No jaundice, icterus.  3/9/2021 60 y/o male presenting for f/u. He was last seen Feb 19 with abdominal pain/nausea. He had an EGD performed on 2/22/2020. He was found to have gastritis--bx were negative for any H.Pylori or metaplasia. He also had a CT scan performed which demonstreated circumferential wall thickening in the pylorus c/w a hx of gastritis. He is here today for f/u.   He has not had any further nausea/emesis/or pain. He has been taking 2 dicyclomine per day and feels well.   2/19/2021 60 y/o male presenting for f/u. He was last seen in the clinic in Dec 2020. Since last saturday he has been having abdominal pain/spasm/bloating. He has not had any nausea/emesis. He had also lost about 11 lbs.  I placed him on dicyclomine PRN and he has been taking it.   12/15/2020 61 y/o male presenting for f/u. He has a prior hx of colon cancer and constipation. He has a bowel movement about every 2 days and does not take any medications currently. He was on benefiber and Miralax and stopped taking those. He does not have any rectal bleeding. He does have intermittent abdominal pain. He does take dicyclomine once daily; he is unsure if it helps his pain.   He has taken Align in the past and will go back on it. He was taking it for gas/bloating and it worked well.   9/4/2020 61 y/o male presenting for f/u. He does have a hx of colon cancer in 2014. He also had a tubular adenoma removed in 2019. He was taken for a colonoscopy recently (8/2020). It was normal. He is here today for f/u.   He does intermittently have constipation vs diarrhea. He cannot find any specific foods that cause diarrhea. He is taking Linzess; however he has stomach pain with it. He takes it PRN if he feels constipated. He has tried to take Metamucil every day. He sometimes doesn't like to take it since it causes feelings of incomplete evacuation.   He was taking Align in the past but could not tell any difference in taking it.

## 2024-05-20 ENCOUNTER — TELEPHONE ENCOUNTER (OUTPATIENT)
Dept: URBAN - METROPOLITAN AREA CLINIC 113 | Facility: CLINIC | Age: 64
End: 2024-05-20

## 2024-05-20 RX ORDER — PLECANATIDE 3 MG/1
1 TABLET TABLET ORAL ONCE A DAY
Qty: 90 | Refills: 0
Start: 2023-07-14 | End: 2024-08-28

## 2024-05-21 ENCOUNTER — TELEPHONE ENCOUNTER (OUTPATIENT)
Dept: URBAN - METROPOLITAN AREA CLINIC 113 | Facility: CLINIC | Age: 64
End: 2024-05-21

## 2024-05-21 RX ORDER — RABEPRAZOLE SODIUM 20 MG/1
1 TABLET TABLET, DELAYED RELEASE ORAL ONCE A DAY
Qty: 90 TABLET | Refills: 3
Start: 2024-02-12

## 2024-05-31 ENCOUNTER — ERX REFILL RESPONSE (OUTPATIENT)
Dept: URBAN - METROPOLITAN AREA CLINIC 107 | Facility: CLINIC | Age: 64
End: 2024-05-31

## 2024-05-31 RX ORDER — RABEPRAZOLE SODIUM 20 MG/1
1 TABLET TABLET, DELAYED RELEASE ORAL ONCE A DAY
Qty: 90 TABLET | Refills: 3 | OUTPATIENT

## 2024-06-11 ENCOUNTER — TELEPHONE ENCOUNTER (OUTPATIENT)
Dept: URBAN - METROPOLITAN AREA CLINIC 113 | Facility: CLINIC | Age: 64
End: 2024-06-11

## 2024-06-12 ENCOUNTER — TELEPHONE ENCOUNTER (OUTPATIENT)
Dept: URBAN - METROPOLITAN AREA CLINIC 113 | Facility: CLINIC | Age: 64
End: 2024-06-12

## 2024-08-13 ENCOUNTER — OFFICE VISIT (OUTPATIENT)
Dept: URBAN - METROPOLITAN AREA CLINIC 113 | Facility: CLINIC | Age: 64
End: 2024-08-13
Payer: MEDICARE

## 2024-08-13 ENCOUNTER — DASHBOARD ENCOUNTERS (OUTPATIENT)
Age: 64
End: 2024-08-13

## 2024-08-13 VITALS
RESPIRATION RATE: 18 BRPM | BODY MASS INDEX: 31.09 KG/M2 | DIASTOLIC BLOOD PRESSURE: 66 MMHG | SYSTOLIC BLOOD PRESSURE: 115 MMHG | HEIGHT: 73 IN | WEIGHT: 234.6 LBS | HEART RATE: 78 BPM | TEMPERATURE: 98 F

## 2024-08-13 DIAGNOSIS — R10.13 EPIGASTRIC BURNING SENSATION: ICD-10-CM

## 2024-08-13 DIAGNOSIS — K58.1 IRRITABLE BOWEL SYNDROME WITH CONSTIPATION: ICD-10-CM

## 2024-08-13 DIAGNOSIS — Z85.038 HISTORY OF COLON CANCER: ICD-10-CM

## 2024-08-13 PROCEDURE — 99214 OFFICE O/P EST MOD 30 MIN: CPT

## 2024-08-13 RX ORDER — LIDOCAINE HYDROCHLORIDE 30 MG/G
USE TOPICALLY AS DIRECTED CREAM TOPICAL
Refills: 0 | Status: ACTIVE | COMMUNITY

## 2024-08-13 RX ORDER — HYDROCHLOROTHIAZIDE 12.5 MG/1
TAKE 1 CAPSULE ONCE DAILY CAPSULE ORAL
Refills: 0 | Status: ACTIVE | COMMUNITY
Start: 2019-02-22

## 2024-08-13 RX ORDER — SITAGLIPTIN 100 MG/1
1 TABLET TABLET, FILM COATED ORAL ONCE A DAY
Status: ACTIVE | COMMUNITY

## 2024-08-13 RX ORDER — PANTOPRAZOLE SODIUM 20 MG/1
1 TABLET TABLET, DELAYED RELEASE ORAL ONCE A DAY
Qty: 90 TABLET | Refills: 3 | Status: ACTIVE | COMMUNITY
Start: 2023-09-27

## 2024-08-13 RX ORDER — FAMOTIDINE 20 MG/1
TAKE 1 TABLET DAILY TABLET ORAL
Refills: 0 | Status: ON HOLD | COMMUNITY
Start: 2019-11-07

## 2024-08-13 RX ORDER — RABEPRAZOLE SODIUM 20 MG/1
1 TABLET TABLET, DELAYED RELEASE ORAL ONCE A DAY
Qty: 90 TABLET | Refills: 3 | Status: ACTIVE | COMMUNITY

## 2024-08-13 RX ORDER — HYDROXYZINE HYDROCHLORIDE 25 MG/1
1 TABLET AS NEEDED TABLET, FILM COATED ORAL ONCE A DAY
Status: ACTIVE | COMMUNITY

## 2024-08-13 RX ORDER — OMEPRAZOLE 20 MG/1
TAKE 1 CAPSULE DAILY CAPSULE, DELAYED RELEASE ORAL ONCE A DAY
Refills: 0 | Status: ON HOLD | COMMUNITY
Start: 2019-12-03

## 2024-08-13 RX ORDER — RABEPRAZOLE SODIUM 20 MG/1
1 TABLET TABLET, DELAYED RELEASE ORAL ONCE A DAY
OUTPATIENT

## 2024-08-13 RX ORDER — RAMIPRIL 10 MG/1
TAKE 1 CAPSULE ONCE DAILY CAPSULE ORAL
Refills: 0 | Status: ACTIVE | COMMUNITY
Start: 2019-02-13

## 2024-08-13 RX ORDER — PLECANATIDE 3 MG/1
1 TABLET TABLET ORAL ONCE A DAY
Qty: 90 | Refills: 0 | Status: ACTIVE | COMMUNITY
Start: 2023-07-14 | End: 2024-08-28

## 2024-08-13 RX ORDER — SUCRALFATE 1 G/1
1 TABLET ON AN EMPTY STOMACH TABLET ORAL TWICE A DAY
Qty: 60 | Refills: 3 | Status: ACTIVE | COMMUNITY

## 2024-08-13 RX ORDER — GABAPENTIN 300 MG/1
TAKE 1 CAPSULE CAPSULE ORAL
Refills: 0 | Status: ACTIVE | COMMUNITY
Start: 2020-01-02

## 2024-08-13 RX ORDER — PSYLLIUM SEED
AS DIRECTED PACKET (EA) ORAL
Status: ON HOLD | COMMUNITY

## 2024-08-13 RX ORDER — TRAMADOL HYDROCHLORIDE AND ACETAMINOPHEN 37.5; 325 MG/1; MG/1
TAKE 2 TABLETS EVERY 4-6 HOURS AS NEEDED FOR PAIN RELIEF TABLET, FILM COATED ORAL
Refills: 0 | Status: ACTIVE | COMMUNITY
Start: 2019-01-24

## 2024-08-13 RX ORDER — SALICYLIC ACID 3 G/100G
1 TABLET LOTION TOPICAL ONCE A DAY
Status: ACTIVE | COMMUNITY

## 2024-08-13 RX ORDER — ATORVASTATIN CALCIUM 80 MG/1
0.5 TABLET TABLET, FILM COATED ORAL
Status: ACTIVE | COMMUNITY

## 2024-08-13 RX ORDER — ASPIRIN 325 MG
1 CAPSULE TABLET ORAL ONCE A DAY
Status: ACTIVE | COMMUNITY

## 2024-08-13 RX ORDER — AMITRIPTYLINE HYDROCHLORIDE 10 MG/1
1 TABLET AT BEDTIME TABLET, FILM COATED ORAL ONCE A DAY
Qty: 90 | Refills: 2 | OUTPATIENT
Start: 2024-08-13

## 2024-08-13 RX ORDER — SUCRALFATE 1 G/1
1 TABLET ON AN EMPTY STOMACH TABLET ORAL
Refills: 3 | OUTPATIENT

## 2024-08-13 NOTE — HPI-TODAY'S VISIT:
64 year old male presents for same day appointment regarding abdominal pain.   He was diagnosed with Covid on 7/4 and treated with Paxlovid x 5 days. He is awaiting a neuro evaluation for memory loss.  He reports recurrent epigastric burning. This started last Friday. He cannot recall any major changes prior besides eating more greasy foods while traveling and more stress due to work needed on his house. He had Covid in early July however he was not having symptoms following resolution. He states food and water worsens the burning. He has been compliant with his PPI. He was taking the Carafate only as needed, not daily. His bowels remain regular on Trulance 1-2 times per week. He drinks a beer occasionally. He took two Meloxicam tablets a few weeks ago otherwise denies routine NSAID use.   4/2024: 64-year-old male presents for follow-up.  He was last seen on 2/21/2024.  He was recommended to start rabeprazole as previously directed and try Carafate twice daily.  He is planned for EGD as well.  If normal and symptoms persisted we would consider CT imaging.  Regarding his constipation, this was suboptimally controlled.  He was to increase Trulance use to daily.  EGD 3/4/2024: Performed without difficulty.  Normal esophagus and duodenum.  Gastritis, biopsied.  Pathology revealed mild chemical/reactive gastropathy, negative for H. pylori or intestinal metaplasia.  He presents today with his wife.  He has had marked improvement in his symptoms with rabeprazole once a day and Carafate as needed.  His constipation has been controlled with only taking Trulance once a week.  He is going daily.  He otherwise has no complaints at this time.  2/21/2024: 64-year-old male presents for follow-up.  He was last seen on 9/27/2023.  His PPI was switched to pantoprazole 20 mg daily.  He was to continue Trulance as needed for his constipation.  He called the office in early February complaining of stomach burning and requesting an earlier appointment.  He did not understand why he was taking pantoprazole and was displeased.  We did try to contact him.  He was to try Aciphex instead and prescription was sent to his pharmacy.  For the past 3 weeks has he has worsening burning in his epigastrium and nausea. He states Pantoprazole was helping at first but lost effect. he denies nay new medications or changes in medications prior to symptom onset. He is not taking his Trulance regularly. He has a BM every 3-4 days.   9/27/2023:  62 y/o male presenting for f/u. He was last seen in July 2023. He has a hx of constipation and colon cancer. His last CSC was September 14, 2023and he was found to have a small colon TA. He does have bloating/pain. He has lost 12 lbs with diet/weight loss.   He does still have bloating/constipation. He is on Trulance PRN. He thinks omeprazole is not working well.   7/14/23 63 year old male presents for follow up. He was last seen on 4/25/2023. He had persistent bloating and constipation. He did not tolerate Linzess. He was tried on Trulance. No rx was sent.  He was unaware he was to take the Trulance every day. He took it on occasion and would caused "bubbly gut" but no bowel movement. Denies blood per rectum or unintentional weight loss. His bloating does improve with a BM.   4/25/2023 62 y/o male presenting for f/u. He was last seen in March 2022. He has been seen for bloating and constipation.  He has still had some constipation. He did not tolerate Linzess.   3/28/22 62-year-old male presenting for follow-up.  He was last seen in clinic in September 2021. His main complaint today is bloating. He does have chronic idiopathic constipation but takes smooth move tea every few days.  This does induce a bowel movement.  He denies any rectal bleeding or weight loss.  He denies any chronic abdominal pain.  9/15/21 61-year-old male presenting for follow-up.  He was last seen in the clinic on May 12, 2021. He is here today with a c/o bloating. He has taken dicyclomine but it makes him drowsy. He has also tried benefiberbut it makes him have an upset stomach. He has about 2 bm every 2-3 days. His main complaint is bloating. He denies any belching/burping.   5/12/21 61-year-old male presenting for follow-up.  He was last seen in the clinic on March 9, 2021. He has been taking his miralax dailybut it feels like he has incomplete evacaution.   Patient is without any abdominal complaints. No dysphagia, heartburn, regurgitation, unintentional weight loss, nausea, vomiting, hematemesis or melena. Bowels are moving regularly without blood per rectum. No complaints of bloating. No jaundice, icterus.  3/9/2021 60 y/o male presenting for f/u. He was last seen Feb 19 with abdominal pain/nausea. He had an EGD performed on 2/22/2020. He was found to have gastritis--bx were negative for any H.Pylori or metaplasia. He also had a CT scan performed which demonstreated circumferential wall thickening in the pylorus c/w a hx of gastritis. He is here today for f/u.   He has not had any further nausea/emesis/or pain. He has been taking 2 dicyclomine per day and feels well.   2/19/2021 60 y/o male presenting for f/u. He was last seen in the clinic in Dec 2020. Since last saturday he has been having abdominal pain/spasm/bloating. He has not had any nausea/emesis. He had also lost about 11 lbs.  I placed him on dicyclomine PRNand he has been taking it.   12/15/2020 59 y/o male presenting for f/u. He has a prior hx of colon cancer and constipation. He has a bowel movement about every 2 days and does not take any medications currently. He was on benefiber and Miralax and stopped taking those. He does not have any rectal bleeding. He does have intermittent abdominal pain. He does take dicyclomine once daily; he is unsure if it helps his pain.   He has taken Align in the past and will go back on it. He was taking it for gas/bloating and it worked well.   9/4/2020 59 y/o male presenting for f/u. He does have a hx of colon cancer in 2014. He also had a tubular adenoma removed in 2019. He was taken for a colonoscopy recently (8/2020). It was normal. He is here today for f/u.   He does intermittently have constipation vs diarrhea. He cannot find any specific foods that cause diarrhea. He is taking Linzess; however he has stomach pain with it. He takes it PRN if he feels constipated. He has tried to take Metamucil every day. He sometimes doesn't like to take it since it causes feelings of incomplete evacuation.   He was taking Align in the past but could not tell any difference in taking it.

## 2024-08-16 ENCOUNTER — TELEPHONE ENCOUNTER (OUTPATIENT)
Dept: URBAN - METROPOLITAN AREA CLINIC 107 | Facility: CLINIC | Age: 64
End: 2024-08-16

## 2024-09-06 ENCOUNTER — TELEPHONE ENCOUNTER (OUTPATIENT)
Dept: URBAN - METROPOLITAN AREA CLINIC 113 | Facility: CLINIC | Age: 64
End: 2024-09-06

## 2024-09-06 RX ORDER — RABEPRAZOLE SODIUM 20 MG/1
1 TABLET TABLET, DELAYED RELEASE ORAL ONCE A DAY
Qty: 90 TABLET | Refills: 3

## 2024-09-24 ENCOUNTER — OFFICE VISIT (OUTPATIENT)
Dept: URBAN - METROPOLITAN AREA CLINIC 107 | Facility: CLINIC | Age: 64
End: 2024-09-24
Payer: MEDICARE

## 2024-09-24 ENCOUNTER — OFFICE VISIT (OUTPATIENT)
Dept: URBAN - METROPOLITAN AREA CLINIC 107 | Facility: CLINIC | Age: 64
End: 2024-09-24

## 2024-09-24 VITALS
SYSTOLIC BLOOD PRESSURE: 155 MMHG | OXYGEN SATURATION: 99 % | RESPIRATION RATE: 18 BRPM | BODY MASS INDEX: 30.46 KG/M2 | HEIGHT: 73 IN | HEART RATE: 80 BPM | WEIGHT: 229.8 LBS | TEMPERATURE: 97.7 F | DIASTOLIC BLOOD PRESSURE: 66 MMHG

## 2024-09-24 DIAGNOSIS — C18.9 COLON CANCER: ICD-10-CM

## 2024-09-24 DIAGNOSIS — K21.9 GASTROESOPHAGEAL REFLUX DISEASE, UNSPECIFIED WHETHER ESOPHAGITIS PRESENT: ICD-10-CM

## 2024-09-24 DIAGNOSIS — R10.13 EPIGASTRIC BURNING SENSATION: ICD-10-CM

## 2024-09-24 DIAGNOSIS — K58.1 IRRITABLE BOWEL SYNDROME WITH CONSTIPATION: ICD-10-CM

## 2024-09-24 DIAGNOSIS — R11.2 INTRACTABLE VOMITING WITH NAUSEA, UNSPECIFIED VOMITING TYPE: ICD-10-CM

## 2024-09-24 DIAGNOSIS — R14.0 BLOATING: ICD-10-CM

## 2024-09-24 PROCEDURE — 99214 OFFICE O/P EST MOD 30 MIN: CPT | Performed by: INTERNAL MEDICINE

## 2024-09-24 RX ORDER — FAMOTIDINE 20 MG/1
TAKE 1 TABLET DAILY TABLET ORAL
Refills: 0 | Status: ON HOLD | COMMUNITY
Start: 2019-11-07

## 2024-09-24 RX ORDER — ATORVASTATIN CALCIUM 80 MG/1
0.5 TABLET TABLET, FILM COATED ORAL
Status: ACTIVE | COMMUNITY

## 2024-09-24 RX ORDER — RAMIPRIL 10 MG/1
TAKE 1 CAPSULE ONCE DAILY CAPSULE ORAL
Refills: 0 | Status: ACTIVE | COMMUNITY
Start: 2019-02-13

## 2024-09-24 RX ORDER — PANTOPRAZOLE SODIUM 20 MG/1
1 TABLET TABLET, DELAYED RELEASE ORAL ONCE A DAY
Qty: 90 TABLET | Refills: 3 | Status: DISCONTINUED | COMMUNITY
Start: 2023-09-27

## 2024-09-24 RX ORDER — FLUTICASONE PROPIONATE 50 UG/1
1 SPRAY IN EACH NOSTRIL SPRAY, METERED NASAL ONCE A DAY
Status: ACTIVE | COMMUNITY

## 2024-09-24 RX ORDER — RABEPRAZOLE SODIUM 20 MG/1
1 TABLET TABLET, DELAYED RELEASE ORAL ONCE A DAY
Status: DISCONTINUED | COMMUNITY

## 2024-09-24 RX ORDER — HYDROXYZINE HYDROCHLORIDE 25 MG/1
1 TABLET AS NEEDED TABLET, FILM COATED ORAL ONCE A DAY
Status: ACTIVE | COMMUNITY

## 2024-09-24 RX ORDER — ASPIRIN 325 MG
1 CAPSULE TABLET ORAL ONCE A DAY
Status: ACTIVE | COMMUNITY

## 2024-09-24 RX ORDER — AMITRIPTYLINE HYDROCHLORIDE 10 MG/1
1 TABLET AT BEDTIME TABLET, FILM COATED ORAL ONCE A DAY
Status: ACTIVE | COMMUNITY

## 2024-09-24 RX ORDER — SALICYLIC ACID 3 G/100G
1 TABLET LOTION TOPICAL ONCE A DAY
Status: ACTIVE | COMMUNITY

## 2024-09-24 RX ORDER — OMEPRAZOLE 20 MG/1
TAKE 1 CAPSULE DAILY CAPSULE, DELAYED RELEASE ORAL ONCE A DAY
Refills: 0 | Status: ON HOLD | COMMUNITY
Start: 2019-12-03

## 2024-09-24 RX ORDER — LIDOCAINE HYDROCHLORIDE 30 MG/G
USE TOPICALLY AS DIRECTED CREAM TOPICAL
Refills: 0 | Status: ACTIVE | COMMUNITY

## 2024-09-24 RX ORDER — GABAPENTIN 300 MG/1
TAKE 1 CAPSULE CAPSULE ORAL
Refills: 0 | Status: ACTIVE | COMMUNITY
Start: 2020-01-02

## 2024-09-24 RX ORDER — TRAMADOL HYDROCHLORIDE AND ACETAMINOPHEN 37.5; 325 MG/1; MG/1
TAKE 2 TABLETS EVERY 4-6 HOURS AS NEEDED FOR PAIN RELIEF TABLET, FILM COATED ORAL
Refills: 0 | Status: ACTIVE | COMMUNITY
Start: 2019-01-24

## 2024-09-24 RX ORDER — PSYLLIUM SEED
AS DIRECTED PACKET (EA) ORAL
Status: ON HOLD | COMMUNITY

## 2024-09-24 RX ORDER — RABEPRAZOLE SODIUM 20 MG/1
1 TABLET TABLET, DELAYED RELEASE ORAL ONCE A DAY
Status: ACTIVE | COMMUNITY

## 2024-09-24 RX ORDER — AMITRIPTYLINE HYDROCHLORIDE 10 MG/1
1 TABLET AT BEDTIME TABLET, FILM COATED ORAL ONCE A DAY
Qty: 90 | Refills: 2 | Status: ACTIVE | COMMUNITY
Start: 2024-08-13

## 2024-09-24 RX ORDER — HYDROCHLOROTHIAZIDE 12.5 MG/1
TAKE 1 CAPSULE ONCE DAILY CAPSULE ORAL
Refills: 0 | Status: ACTIVE | COMMUNITY
Start: 2019-02-22

## 2024-09-24 RX ORDER — SUCRALFATE 1 G/1
1 TABLET ON AN EMPTY STOMACH TABLET ORAL
Refills: 3 | Status: ACTIVE | COMMUNITY

## 2024-09-24 RX ORDER — SITAGLIPTIN 100 MG/1
1 TABLET TABLET, FILM COATED ORAL ONCE A DAY
Status: ACTIVE | COMMUNITY

## 2024-09-24 RX ORDER — RABEPRAZOLE SODIUM 20 MG/1
1 TABLET TABLET, DELAYED RELEASE ORAL ONCE A DAY
Qty: 90 TABLET | Refills: 3 | Status: ACTIVE | COMMUNITY

## 2024-09-24 RX ORDER — PANTOPRAZOLE SODIUM 20 MG/1
1 TABLET TABLET, DELAYED RELEASE ORAL ONCE A DAY
Qty: 90 TABLET | Refills: 3 | Status: ACTIVE | COMMUNITY
Start: 2023-09-27

## 2024-09-24 NOTE — HPI-ZZZTODAY'S VISIT
64-year-old male presenting for follow-up.  He was last seen on August 13, 2024 with a primary complaint of epigastric abdominal pain.  He has had chronic intermittent GI symptoms since we began seeing him in 2020.  He also has a prior history of IBS with constipation as well as colon cancer.  In regards to his constipation he did not tolerate Linzess.  He was controlled with Trulance taken on a as needed basis once to twice per week.  In regards to his colon cancer he was recommended to repeat a colonoscopy for surveillance in 2028.  He also had intermittent epigastric abdominal pain and burning.  Pantoprazole was ineffective in the past and he was switched to Aciphex and Carafate.  He did have marked improvement.  He did have an upper endoscopy in April 2024 which was relatively unremarkable.  We started him on amitriptyline 10 mg tablets at bedtime for possible functional abdominal discomfort.  He also had a CT scan performed on September 6, 2024 which was normal.  He does feel better now. He states that his burnig did resolve after abotu 7 days. He still takes carafate.

## 2025-02-04 ENCOUNTER — TELEPHONE ENCOUNTER (OUTPATIENT)
Dept: URBAN - METROPOLITAN AREA CLINIC 107 | Facility: CLINIC | Age: 65
End: 2025-02-04

## 2025-02-04 ENCOUNTER — OFFICE VISIT (OUTPATIENT)
Dept: URBAN - METROPOLITAN AREA CLINIC 107 | Facility: CLINIC | Age: 65
End: 2025-02-04
Payer: MEDICARE

## 2025-02-04 VITALS
HEART RATE: 73 BPM | DIASTOLIC BLOOD PRESSURE: 78 MMHG | HEIGHT: 73 IN | BODY MASS INDEX: 30.19 KG/M2 | OXYGEN SATURATION: 97 % | TEMPERATURE: 97.3 F | RESPIRATION RATE: 18 BRPM | WEIGHT: 227.8 LBS | SYSTOLIC BLOOD PRESSURE: 147 MMHG

## 2025-02-04 DIAGNOSIS — K58.1 IRRITABLE BOWEL SYNDROME WITH CONSTIPATION: ICD-10-CM

## 2025-02-04 DIAGNOSIS — K21.9 GASTROESOPHAGEAL REFLUX DISEASE, UNSPECIFIED WHETHER ESOPHAGITIS PRESENT: ICD-10-CM

## 2025-02-04 DIAGNOSIS — C18.9 COLON CANCER: ICD-10-CM

## 2025-02-04 DIAGNOSIS — R14.0 BLOATING: ICD-10-CM

## 2025-02-04 DIAGNOSIS — K63.8219 SMALL INTESTINAL BACTERIAL OVERGROWTH (SIBO): ICD-10-CM

## 2025-02-04 PROCEDURE — 99214 OFFICE O/P EST MOD 30 MIN: CPT | Performed by: NURSE PRACTITIONER

## 2025-02-04 RX ORDER — RABEPRAZOLE SODIUM 20 MG/1
1 TABLET TABLET, DELAYED RELEASE ORAL ONCE A DAY
Qty: 90 TABLET | Refills: 3

## 2025-02-04 RX ORDER — SITAGLIPTIN 100 MG/1
1 TABLET TABLET, FILM COATED ORAL ONCE A DAY
Status: ACTIVE | COMMUNITY

## 2025-02-04 RX ORDER — TRAMADOL HYDROCHLORIDE AND ACETAMINOPHEN 37.5; 325 MG/1; MG/1
TAKE 2 TABLETS EVERY 4-6 HOURS AS NEEDED FOR PAIN RELIEF TABLET, FILM COATED ORAL
Refills: 0 | Status: ACTIVE | COMMUNITY
Start: 2019-01-24

## 2025-02-04 RX ORDER — FAMOTIDINE 20 MG/1
TAKE 1 TABLET DAILY TABLET ORAL
Refills: 0 | Status: DISCONTINUED | COMMUNITY
Start: 2019-11-07

## 2025-02-04 RX ORDER — GABAPENTIN 300 MG/1
TAKE 1 CAPSULE CAPSULE ORAL
Refills: 0 | Status: ACTIVE | COMMUNITY
Start: 2020-01-02

## 2025-02-04 RX ORDER — SERTRALINE 50 MG/1
1 TABLET TABLET, FILM COATED ORAL ONCE A DAY
Status: ACTIVE | COMMUNITY

## 2025-02-04 RX ORDER — ASPIRIN 325 MG
1 CAPSULE TABLET ORAL ONCE A DAY
Status: ACTIVE | COMMUNITY

## 2025-02-04 RX ORDER — LOSARTAN POTASSIUM 100 MG/1
1 TABLET TABLET, FILM COATED ORAL ONCE A DAY
Status: ACTIVE | COMMUNITY

## 2025-02-04 RX ORDER — HYDROCHLOROTHIAZIDE 12.5 MG/1
TAKE 1 CAPSULE ONCE DAILY CAPSULE ORAL
Refills: 0 | Status: DISCONTINUED | COMMUNITY
Start: 2019-02-22

## 2025-02-04 RX ORDER — AMITRIPTYLINE HYDROCHLORIDE 10 MG/1
1 TABLET AT BEDTIME TABLET, FILM COATED ORAL ONCE A DAY
Qty: 90 | Refills: 2 | Status: ACTIVE | COMMUNITY
Start: 2024-08-13

## 2025-02-04 RX ORDER — AMITRIPTYLINE HYDROCHLORIDE 10 MG/1
1 TABLET AT BEDTIME TABLET, FILM COATED ORAL ONCE A DAY
Status: DISCONTINUED | COMMUNITY

## 2025-02-04 RX ORDER — HYDROXYZINE HYDROCHLORIDE 25 MG/1
1 TABLET AS NEEDED TABLET, FILM COATED ORAL ONCE A DAY
Status: ACTIVE | COMMUNITY

## 2025-02-04 RX ORDER — RIFAXIMIN 550 MG/1
1 TABLET TABLET ORAL THREE TIMES A DAY
Qty: 42 TABLET | Refills: 0 | OUTPATIENT
Start: 2025-02-04 | End: 2025-02-18

## 2025-02-04 RX ORDER — RAMIPRIL 10 MG/1
TAKE 1 CAPSULE ONCE DAILY CAPSULE ORAL
Refills: 0 | Status: DISCONTINUED | COMMUNITY
Start: 2019-02-13

## 2025-02-04 RX ORDER — FLUTICASONE PROPIONATE 50 UG/1
1 SPRAY IN EACH NOSTRIL SPRAY, METERED NASAL ONCE A DAY
Status: ACTIVE | COMMUNITY

## 2025-02-04 RX ORDER — PSYLLIUM SEED
AS DIRECTED PACKET (EA) ORAL
Status: DISCONTINUED | COMMUNITY

## 2025-02-04 RX ORDER — RABEPRAZOLE SODIUM 20 MG/1
1 TABLET TABLET, DELAYED RELEASE ORAL ONCE A DAY
Qty: 90 TABLET | Refills: 3 | Status: ACTIVE | COMMUNITY

## 2025-02-04 RX ORDER — SALICYLIC ACID 3 G/100G
1 TABLET LOTION TOPICAL ONCE A DAY
Status: ACTIVE | COMMUNITY

## 2025-02-04 RX ORDER — SUCRALFATE 1 G/1
1 TABLET ON AN EMPTY STOMACH TABLET ORAL
Refills: 3 | Status: ACTIVE | COMMUNITY

## 2025-02-04 RX ORDER — OMEPRAZOLE 20 MG/1
TAKE 1 CAPSULE DAILY CAPSULE, DELAYED RELEASE ORAL ONCE A DAY
Refills: 0 | Status: DISCONTINUED | COMMUNITY
Start: 2019-12-03

## 2025-02-04 RX ORDER — ATORVASTATIN CALCIUM 80 MG/1
0.5 TABLET TABLET, FILM COATED ORAL
Status: ACTIVE | COMMUNITY

## 2025-02-04 RX ORDER — LIDOCAINE HYDROCHLORIDE 30 MG/G
USE TOPICALLY AS DIRECTED CREAM TOPICAL
Refills: 0 | Status: ACTIVE | COMMUNITY

## 2025-02-04 NOTE — HPI-TODAY'S VISIT:
65-year-old male presenting for follow-up.   Last seen 9/24/2024 for epigastric burning, GERD and nausea and vomiting. Pantoprazole ineffective in the past was switched to rabeprazole and Carafate with marked improvement. Chronic constipation did not tolerate Linzess currently controlled on Trulance 1-2 times per week increase as needed. Due for surveillance colonoscopy in 2028 for history of colon cancer.  He has had chronic intermittent GI symptoms since we began seeing him in 2020. He also has a prior history of IBS with constipation as well as colon cancer. In regard to his constipation he did not tolerate Linzess. He was controlled with Trulance taken on a as needed basis once to twice per week. In regard to his colon cancer he was recommended to repeat a colonoscopy for surveillance in 2028. He also had intermittent epigastric abdominal pain and burning. Pantoprazole was ineffective in the pastand he was switched to AcipHex and Carafate. He did have marked improvement. He did have an upper endoscopy in April 2024 which was relatively unremarkable. We started him on amitriptyline 10 mg tablets at bedtime for possible functional abdominal discomfort. He also had a CT scan performed on September 6, 2024, which was normal.  Interval history, 2/4/2025 Labs 10/14/2024. CBC:Hgb 12.1, HCT 37.2. CMP: Creatinine 1.6, glucose 114. Hemoglobin A1c 7.1.  Occasional bloating, Trulance every other day. He is out of samples.  GERD controlled with AcipHex

## 2025-02-05 ENCOUNTER — TELEPHONE ENCOUNTER (OUTPATIENT)
Dept: URBAN - METROPOLITAN AREA CLINIC 107 | Facility: CLINIC | Age: 65
End: 2025-02-05

## 2025-02-24 ENCOUNTER — TELEPHONE ENCOUNTER (OUTPATIENT)
Dept: URBAN - METROPOLITAN AREA CLINIC 107 | Facility: CLINIC | Age: 65
End: 2025-02-24

## 2025-02-24 RX ORDER — RABEPRAZOLE SODIUM 20 MG/1
1 TABLET TABLET, DELAYED RELEASE ORAL ONCE A DAY
Qty: 90 TABLET | Refills: 3

## 2025-04-29 ENCOUNTER — OFFICE VISIT (OUTPATIENT)
Dept: URBAN - METROPOLITAN AREA CLINIC 107 | Facility: CLINIC | Age: 65
End: 2025-04-29
Payer: MEDICARE

## 2025-04-29 ENCOUNTER — TELEPHONE ENCOUNTER (OUTPATIENT)
Dept: URBAN - METROPOLITAN AREA CLINIC 113 | Facility: CLINIC | Age: 65
End: 2025-04-29

## 2025-04-29 DIAGNOSIS — C18.9 COLON CANCER: ICD-10-CM

## 2025-04-29 DIAGNOSIS — R14.0 BLOATING: ICD-10-CM

## 2025-04-29 DIAGNOSIS — K63.8219 SMALL INTESTINAL BACTERIAL OVERGROWTH (SIBO): ICD-10-CM

## 2025-04-29 DIAGNOSIS — R11.2 INTRACTABLE VOMITING WITH NAUSEA, UNSPECIFIED VOMITING TYPE: ICD-10-CM

## 2025-04-29 DIAGNOSIS — K21.9 GASTROESOPHAGEAL REFLUX DISEASE, UNSPECIFIED WHETHER ESOPHAGITIS PRESENT: ICD-10-CM

## 2025-04-29 DIAGNOSIS — K58.1 IRRITABLE BOWEL SYNDROME WITH CONSTIPATION: ICD-10-CM

## 2025-04-29 PROCEDURE — 99214 OFFICE O/P EST MOD 30 MIN: CPT | Performed by: INTERNAL MEDICINE

## 2025-04-29 RX ORDER — SERTRALINE 50 MG/1
1 TABLET TABLET, FILM COATED ORAL ONCE A DAY
Status: ACTIVE | COMMUNITY

## 2025-04-29 RX ORDER — RIFAXIMIN 550 MG/1
1 TABLET TABLET ORAL THREE TIMES A DAY
Qty: 42 TABLET | Refills: 0 | OUTPATIENT
Start: 2025-04-29 | End: 2025-05-13

## 2025-04-29 RX ORDER — BUPROPION HYDROCHLORIDE 100 MG/1
1 TABLET TABLET, FILM COATED ORAL TWICE A DAY
Status: ACTIVE | COMMUNITY

## 2025-04-29 RX ORDER — ATORVASTATIN CALCIUM 80 MG/1
0.5 TABLET TABLET, FILM COATED ORAL
Status: ACTIVE | COMMUNITY

## 2025-04-29 RX ORDER — ASPIRIN 325 MG
1 CAPSULE TABLET ORAL ONCE A DAY
Status: ACTIVE | COMMUNITY

## 2025-04-29 RX ORDER — RABEPRAZOLE SODIUM 20 MG/1
1 TABLET 1/2 TO 1 HOUR BEFORE A MEAL TABLET, DELAYED RELEASE ORAL ONCE A DAY
Status: ACTIVE | COMMUNITY

## 2025-04-29 RX ORDER — SALICYLIC ACID 3 G/100G
1 TABLET LOTION TOPICAL ONCE A DAY
Status: ACTIVE | COMMUNITY

## 2025-04-29 RX ORDER — PLECANATIDE 3 MG/1
1 TABLET TABLET ORAL ONCE A DAY
Status: ACTIVE | COMMUNITY

## 2025-04-29 RX ORDER — LOSARTAN POTASSIUM 100 MG/1
1 TABLET TABLET, FILM COATED ORAL ONCE A DAY
Status: ACTIVE | COMMUNITY

## 2025-04-29 RX ORDER — GLIPIZIDE 5 MG/1
1 TABLET 30 MINUTES BEFORE BREAKFAST TABLET ORAL ONCE A DAY
Status: ACTIVE | COMMUNITY

## 2025-04-29 RX ORDER — LIDOCAINE HYDROCHLORIDE 30 MG/G
USE TOPICALLY AS DIRECTED CREAM TOPICAL
Refills: 0 | Status: ACTIVE | COMMUNITY

## 2025-04-29 RX ORDER — RABEPRAZOLE SODIUM 20 MG/1
1 TABLET TABLET, DELAYED RELEASE ORAL ONCE A DAY
Qty: 90 TABLET | Refills: 3 | Status: ON HOLD | COMMUNITY

## 2025-04-29 RX ORDER — HYDROXYZINE HYDROCHLORIDE 25 MG/1
1 TABLET AS NEEDED TABLET, FILM COATED ORAL ONCE A DAY
Status: ACTIVE | COMMUNITY

## 2025-04-29 RX ORDER — AMITRIPTYLINE HYDROCHLORIDE 10 MG/1
1 TABLET AT BEDTIME TABLET, FILM COATED ORAL ONCE A DAY
Qty: 90 | Refills: 2 | Status: ACTIVE | COMMUNITY
Start: 2024-08-13

## 2025-04-29 RX ORDER — SUCRALFATE 1 G/1
1 TABLET ON AN EMPTY STOMACH TABLET ORAL
Refills: 3 | Status: ACTIVE | COMMUNITY

## 2025-04-29 RX ORDER — FLUTICASONE PROPIONATE 50 UG/1
1 SPRAY IN EACH NOSTRIL SPRAY, METERED NASAL ONCE A DAY
Status: ACTIVE | COMMUNITY

## 2025-04-29 RX ORDER — GABAPENTIN 300 MG/1
TAKE 1 CAPSULE CAPSULE ORAL
Refills: 0 | Status: ACTIVE | COMMUNITY
Start: 2020-01-02

## 2025-04-29 RX ORDER — SITAGLIPTIN 100 MG/1
1 TABLET TABLET, FILM COATED ORAL ONCE A DAY
Status: ACTIVE | COMMUNITY

## 2025-04-29 RX ORDER — TRAMADOL HYDROCHLORIDE AND ACETAMINOPHEN 37.5; 325 MG/1; MG/1
TAKE 2 TABLETS EVERY 4-6 HOURS AS NEEDED FOR PAIN RELIEF TABLET, FILM COATED ORAL
Refills: 0 | Status: ACTIVE | COMMUNITY
Start: 2019-01-24

## 2025-04-29 RX ORDER — SPIRONOLACTONE 25 MG/1
1 TABLET TABLET, FILM COATED ORAL
Status: ACTIVE | COMMUNITY

## 2025-04-29 NOTE — HPI-ZZZTODAY'S VISIT
65-year-old male presenting for follow-up.  He was last seen in February 2025.  He does have a history of GERD, colon cancer, and small intestinal bacterial overgrowth.  He does have chronic intermittent constipation associated with bloating.  He has been managed on Aciphex for  GERD.  His next colonoscopy is due in 2028.  He was treated with Xifaxan for small intestinal bacterial overgrowth.  He is having abdominal pain and constipation. He is concerned about these symptoms.

## 2025-05-01 ENCOUNTER — TELEPHONE ENCOUNTER (OUTPATIENT)
Dept: URBAN - METROPOLITAN AREA CLINIC 113 | Facility: CLINIC | Age: 65
End: 2025-05-01

## 2025-05-01 ENCOUNTER — TELEPHONE ENCOUNTER (OUTPATIENT)
Dept: URBAN - METROPOLITAN AREA CLINIC 107 | Facility: CLINIC | Age: 65
End: 2025-05-01

## 2025-05-01 RX ORDER — RIFAXIMIN 550 MG/1
1 TABLET TABLET ORAL THREE TIMES A DAY
Qty: 42 TABLET | Refills: 0
Start: 2025-04-29 | End: 2025-05-21

## 2025-05-05 ENCOUNTER — OFFICE VISIT (OUTPATIENT)
Dept: URBAN - METROPOLITAN AREA CLINIC 107 | Facility: CLINIC | Age: 65
End: 2025-05-05

## 2025-05-12 ENCOUNTER — TELEPHONE ENCOUNTER (OUTPATIENT)
Dept: URBAN - METROPOLITAN AREA CLINIC 107 | Facility: CLINIC | Age: 65
End: 2025-05-12

## 2025-05-16 ENCOUNTER — TELEPHONE ENCOUNTER (OUTPATIENT)
Dept: URBAN - METROPOLITAN AREA CLINIC 107 | Facility: CLINIC | Age: 65
End: 2025-05-16

## 2025-05-16 RX ORDER — TENAPANOR HYDROCHLORIDE 53.2 MG/1
1 TABLET IMMEDIATELY BEFORE MEALS TABLET ORAL TWICE A DAY
Qty: 180 TABLET | Refills: 3 | OUTPATIENT
Start: 2025-05-19

## 2025-07-11 ENCOUNTER — OFFICE VISIT (OUTPATIENT)
Dept: URBAN - METROPOLITAN AREA CLINIC 107 | Facility: CLINIC | Age: 65
End: 2025-07-11
Payer: MEDICARE

## 2025-07-11 ENCOUNTER — TELEPHONE ENCOUNTER (OUTPATIENT)
Dept: URBAN - METROPOLITAN AREA CLINIC 113 | Facility: CLINIC | Age: 65
End: 2025-07-11

## 2025-07-11 DIAGNOSIS — K63.8219 SMALL INTESTINAL BACTERIAL OVERGROWTH (SIBO): ICD-10-CM

## 2025-07-11 DIAGNOSIS — K21.9 GASTROESOPHAGEAL REFLUX DISEASE, UNSPECIFIED WHETHER ESOPHAGITIS PRESENT: ICD-10-CM

## 2025-07-11 DIAGNOSIS — R11.2 INTRACTABLE VOMITING WITH NAUSEA, UNSPECIFIED VOMITING TYPE: ICD-10-CM

## 2025-07-11 DIAGNOSIS — R14.0 BLOATING: ICD-10-CM

## 2025-07-11 DIAGNOSIS — C18.9 COLON CANCER: ICD-10-CM

## 2025-07-11 DIAGNOSIS — K58.1 IRRITABLE BOWEL SYNDROME WITH CONSTIPATION: ICD-10-CM

## 2025-07-11 PROCEDURE — 99214 OFFICE O/P EST MOD 30 MIN: CPT | Performed by: INTERNAL MEDICINE

## 2025-07-11 RX ORDER — SPIRONOLACTONE 25 MG/1
1 TABLET TABLET, FILM COATED ORAL
Status: ACTIVE | COMMUNITY

## 2025-07-11 RX ORDER — TENAPANOR HYDROCHLORIDE 53.2 MG/1
1 TABLET IMMEDIATELY BEFORE MEALS TABLET ORAL TWICE A DAY
Qty: 180 TABLET | Refills: 3 | Status: ACTIVE | COMMUNITY
Start: 2025-05-19

## 2025-07-11 RX ORDER — ASPIRIN 325 MG
1 CAPSULE TABLET ORAL ONCE A DAY
Status: ACTIVE | COMMUNITY

## 2025-07-11 RX ORDER — BUPROPION HYDROCHLORIDE 100 MG/1
1 TABLET TABLET, FILM COATED ORAL TWICE A DAY
Status: ACTIVE | COMMUNITY

## 2025-07-11 RX ORDER — AMITRIPTYLINE HYDROCHLORIDE 10 MG/1
1 TABLET AT BEDTIME TABLET, FILM COATED ORAL ONCE A DAY
Qty: 90 | Refills: 2 | Status: ACTIVE | COMMUNITY
Start: 2024-08-13

## 2025-07-11 RX ORDER — SALICYLIC ACID 3 G/100G
1 TABLET LOTION TOPICAL ONCE A DAY
Status: ACTIVE | COMMUNITY

## 2025-07-11 RX ORDER — ATORVASTATIN CALCIUM 80 MG/1
0.5 TABLET TABLET, FILM COATED ORAL
Status: ACTIVE | COMMUNITY

## 2025-07-11 RX ORDER — TENAPANOR HYDROCHLORIDE 53.2 MG/1
1 TABLET IMMEDIATELY BEFORE MEALS TABLET ORAL TWICE A DAY
Qty: 180 TABLET | Refills: 3
Start: 2025-07-11

## 2025-07-11 RX ORDER — FLUTICASONE PROPIONATE 50 UG/1
1 SPRAY IN EACH NOSTRIL SPRAY, METERED NASAL ONCE A DAY
Status: ACTIVE | COMMUNITY

## 2025-07-11 RX ORDER — LIDOCAINE HYDROCHLORIDE 30 MG/G
USE TOPICALLY AS DIRECTED CREAM TOPICAL
Refills: 0 | Status: ACTIVE | COMMUNITY

## 2025-07-11 RX ORDER — GABAPENTIN 300 MG/1
TAKE 1 CAPSULE CAPSULE ORAL
Refills: 0 | Status: ACTIVE | COMMUNITY
Start: 2020-01-02

## 2025-07-11 RX ORDER — RABEPRAZOLE SODIUM 20 MG/1
1 TABLET 1/2 TO 1 HOUR BEFORE A MEAL TABLET, DELAYED RELEASE ORAL ONCE A DAY
Status: ACTIVE | COMMUNITY

## 2025-07-11 RX ORDER — GLIPIZIDE 5 MG/1
1 TABLET 30 MINUTES BEFORE BREAKFAST TABLET ORAL ONCE A DAY
Status: ACTIVE | COMMUNITY

## 2025-07-11 RX ORDER — TRAMADOL HYDROCHLORIDE AND ACETAMINOPHEN 37.5; 325 MG/1; MG/1
TAKE 2 TABLETS EVERY 4-6 HOURS AS NEEDED FOR PAIN RELIEF TABLET, FILM COATED ORAL
Refills: 0 | Status: ACTIVE | COMMUNITY
Start: 2019-01-24

## 2025-07-11 RX ORDER — SUCRALFATE 1 G/1
1 TABLET ON AN EMPTY STOMACH TABLET ORAL
Refills: 3 | Status: ACTIVE | COMMUNITY

## 2025-07-11 RX ORDER — HYDROXYZINE HYDROCHLORIDE 25 MG/1
1 TABLET AS NEEDED TABLET, FILM COATED ORAL ONCE A DAY
Status: ACTIVE | COMMUNITY

## 2025-07-11 RX ORDER — TENAPANOR HYDROCHLORIDE 53.2 MG/1
1 TABLET IMMEDIATELY BEFORE MEALS TABLET ORAL TWICE A DAY
Qty: 180 TABLET | Refills: 3 | OUTPATIENT
Start: 2025-07-11

## 2025-07-11 RX ORDER — RABEPRAZOLE SODIUM 20 MG/1
1 TABLET TABLET, DELAYED RELEASE ORAL ONCE A DAY
Qty: 90 TABLET | Refills: 3 | Status: ON HOLD | COMMUNITY

## 2025-07-11 RX ORDER — PLECANATIDE 3 MG/1
1 TABLET TABLET ORAL ONCE A DAY
Status: ACTIVE | COMMUNITY

## 2025-07-11 RX ORDER — ALLOPURINOL 300 MG/1
TABLET ORAL
Qty: 90 TABLET | Status: ACTIVE | COMMUNITY

## 2025-07-11 RX ORDER — LOSARTAN POTASSIUM 100 MG/1
1 TABLET TABLET, FILM COATED ORAL ONCE A DAY
Status: ACTIVE | COMMUNITY

## 2025-07-11 RX ORDER — SITAGLIPTIN 100 MG/1
1 TABLET TABLET, FILM COATED ORAL ONCE A DAY
Status: ACTIVE | COMMUNITY

## 2025-07-11 RX ORDER — SERTRALINE 50 MG/1
1 TABLET TABLET, FILM COATED ORAL ONCE A DAY
Status: ACTIVE | COMMUNITY

## 2025-07-11 RX ORDER — COLCHICINE 0.6 MG/1
TABLET, FILM COATED ORAL
Qty: 30 TABLET | Status: ACTIVE | COMMUNITY

## 2025-07-11 NOTE — HPI-ZZZTODAY'S VISIT
65-year-old male presenting for follow-up.  He was last seen in the clinic in April 2025.  He does have a history of colon cancer due for repeat surveillance in 2028, GERD upper endoscopy performed in 2024 was unremarkable, and chronic idiopathic constipation.  Most recently Ibsrela was working for him.  We did send a prescription to his pharmacy.  We also treated him with Xifaxan in the past but he had increased diarrhea.  He is here today for follow-up.  He has gout. He is on a strict diet for that. He was having increased flares from his diet. He sees a rheumatologist as well as a a podiatrist. He is starting a new medication today.

## 2025-07-18 ENCOUNTER — TELEPHONE ENCOUNTER (OUTPATIENT)
Dept: URBAN - METROPOLITAN AREA CLINIC 113 | Facility: CLINIC | Age: 65
End: 2025-07-18

## 2025-07-21 ENCOUNTER — TELEPHONE ENCOUNTER (OUTPATIENT)
Dept: URBAN - METROPOLITAN AREA CLINIC 113 | Facility: CLINIC | Age: 65
End: 2025-07-21

## 2025-07-29 ENCOUNTER — TELEPHONE ENCOUNTER (OUTPATIENT)
Dept: URBAN - METROPOLITAN AREA CLINIC 107 | Facility: CLINIC | Age: 65
End: 2025-07-29